# Patient Record
Sex: FEMALE
[De-identification: names, ages, dates, MRNs, and addresses within clinical notes are randomized per-mention and may not be internally consistent; named-entity substitution may affect disease eponyms.]

---

## 2017-10-06 ENCOUNTER — ASOB RESULT (OUTPATIENT)
Age: 82
End: 2017-10-06

## 2017-10-06 ENCOUNTER — APPOINTMENT (OUTPATIENT)
Dept: ANTEPARTUM | Facility: CLINIC | Age: 82
End: 2017-10-06
Payer: MEDICARE

## 2017-10-06 PROBLEM — Z00.00 ENCOUNTER FOR PREVENTIVE HEALTH EXAMINATION: Status: ACTIVE | Noted: 2017-10-06

## 2017-10-06 PROCEDURE — 76857 US EXAM PELVIC LIMITED: CPT

## 2017-10-06 PROCEDURE — 76830 TRANSVAGINAL US NON-OB: CPT

## 2023-01-01 ENCOUNTER — INPATIENT (INPATIENT)
Facility: HOSPITAL | Age: 88
LOS: 1 days | DRG: 61 | End: 2023-12-26
Attending: INTERNAL MEDICINE | Admitting: SURGERY
Payer: MEDICARE

## 2023-01-01 VITALS — WEIGHT: 146.17 LBS

## 2023-01-01 DIAGNOSIS — Z66 DO NOT RESUSCITATE: ICD-10-CM

## 2023-01-01 DIAGNOSIS — E43 UNSPECIFIED SEVERE PROTEIN-CALORIE MALNUTRITION: ICD-10-CM

## 2023-01-01 DIAGNOSIS — J90 PLEURAL EFFUSION, NOT ELSEWHERE CLASSIFIED: ICD-10-CM

## 2023-01-01 DIAGNOSIS — G81.91 HEMIPLEGIA, UNSPECIFIED AFFECTING RIGHT DOMINANT SIDE: ICD-10-CM

## 2023-01-01 DIAGNOSIS — I48.91 UNSPECIFIED ATRIAL FIBRILLATION: ICD-10-CM

## 2023-01-01 DIAGNOSIS — R29.723 NIHSS SCORE 23: ICD-10-CM

## 2023-01-01 DIAGNOSIS — I10 ESSENTIAL (PRIMARY) HYPERTENSION: ICD-10-CM

## 2023-01-01 DIAGNOSIS — I63.9 CEREBRAL INFARCTION, UNSPECIFIED: ICD-10-CM

## 2023-01-01 DIAGNOSIS — Z53.09 PROCEDURE AND TREATMENT NOT CARRIED OUT BECAUSE OF OTHER CONTRAINDICATION: ICD-10-CM

## 2023-01-01 DIAGNOSIS — R47.01 APHASIA: ICD-10-CM

## 2023-01-01 DIAGNOSIS — I16.0 HYPERTENSIVE URGENCY: ICD-10-CM

## 2023-01-01 DIAGNOSIS — I63.512 CEREBRAL INFARCTION DUE TO UNSPECIFIED OCCLUSION OR STENOSIS OF LEFT MIDDLE CEREBRAL ARTERY: ICD-10-CM

## 2023-01-01 DIAGNOSIS — N17.9 ACUTE KIDNEY FAILURE, UNSPECIFIED: ICD-10-CM

## 2023-01-01 LAB
A1C WITH ESTIMATED AVERAGE GLUCOSE RESULT: 6.1 % — HIGH (ref 4–5.6)
A1C WITH ESTIMATED AVERAGE GLUCOSE RESULT: 6.1 % — HIGH (ref 4–5.6)
ABO RH CONFIRMATION: SIGNIFICANT CHANGE UP
ABO RH CONFIRMATION: SIGNIFICANT CHANGE UP
ALBUMIN SERPL ELPH-MCNC: 3.1 G/DL — LOW (ref 3.3–5)
ALBUMIN SERPL ELPH-MCNC: 3.1 G/DL — LOW (ref 3.3–5)
ALLERGY+IMMUNOLOGY DIAG STUDY NOTE: SIGNIFICANT CHANGE UP
ALLERGY+IMMUNOLOGY DIAG STUDY NOTE: SIGNIFICANT CHANGE UP
ALP SERPL-CCNC: 173 U/L — HIGH (ref 40–120)
ALP SERPL-CCNC: 173 U/L — HIGH (ref 40–120)
ALT FLD-CCNC: 22 U/L — SIGNIFICANT CHANGE UP (ref 12–78)
ALT FLD-CCNC: 22 U/L — SIGNIFICANT CHANGE UP (ref 12–78)
ANION GAP SERPL CALC-SCNC: 7 MMOL/L — SIGNIFICANT CHANGE UP (ref 5–17)
ANION GAP SERPL CALC-SCNC: 7 MMOL/L — SIGNIFICANT CHANGE UP (ref 5–17)
ANION GAP SERPL CALC-SCNC: 9 MMOL/L — SIGNIFICANT CHANGE UP (ref 5–17)
APPEARANCE UR: CLEAR — SIGNIFICANT CHANGE UP
APPEARANCE UR: CLEAR — SIGNIFICANT CHANGE UP
APTT BLD: 26.4 SEC — SIGNIFICANT CHANGE UP (ref 24.5–35.6)
APTT BLD: 26.4 SEC — SIGNIFICANT CHANGE UP (ref 24.5–35.6)
AST SERPL-CCNC: 28 U/L — SIGNIFICANT CHANGE UP (ref 15–37)
AST SERPL-CCNC: 28 U/L — SIGNIFICANT CHANGE UP (ref 15–37)
BACTERIA # UR AUTO: NEGATIVE /HPF — SIGNIFICANT CHANGE UP
BACTERIA # UR AUTO: NEGATIVE /HPF — SIGNIFICANT CHANGE UP
BASOPHILS # BLD AUTO: 0.04 K/UL — SIGNIFICANT CHANGE UP (ref 0–0.2)
BASOPHILS # BLD AUTO: 0.04 K/UL — SIGNIFICANT CHANGE UP (ref 0–0.2)
BASOPHILS NFR BLD AUTO: 0.6 % — SIGNIFICANT CHANGE UP (ref 0–2)
BASOPHILS NFR BLD AUTO: 0.6 % — SIGNIFICANT CHANGE UP (ref 0–2)
BILIRUB SERPL-MCNC: 1.2 MG/DL — SIGNIFICANT CHANGE UP (ref 0.2–1.2)
BILIRUB SERPL-MCNC: 1.2 MG/DL — SIGNIFICANT CHANGE UP (ref 0.2–1.2)
BILIRUB UR-MCNC: NEGATIVE — SIGNIFICANT CHANGE UP
BILIRUB UR-MCNC: NEGATIVE — SIGNIFICANT CHANGE UP
BLD GP AB SCN SERPL QL: SIGNIFICANT CHANGE UP
BLD GP AB SCN SERPL QL: SIGNIFICANT CHANGE UP
BUN SERPL-MCNC: 25 MG/DL — HIGH (ref 7–23)
BUN SERPL-MCNC: 25 MG/DL — HIGH (ref 7–23)
BUN SERPL-MCNC: 29 MG/DL — HIGH (ref 7–23)
BUN SERPL-MCNC: 29 MG/DL — HIGH (ref 7–23)
BUN SERPL-MCNC: 37 MG/DL — HIGH (ref 7–23)
BUN SERPL-MCNC: 37 MG/DL — HIGH (ref 7–23)
CALCIUM SERPL-MCNC: 8.4 MG/DL — LOW (ref 8.5–10.1)
CALCIUM SERPL-MCNC: 8.4 MG/DL — LOW (ref 8.5–10.1)
CALCIUM SERPL-MCNC: 8.6 MG/DL — SIGNIFICANT CHANGE UP (ref 8.5–10.1)
CALCIUM SERPL-MCNC: 8.6 MG/DL — SIGNIFICANT CHANGE UP (ref 8.5–10.1)
CALCIUM SERPL-MCNC: 8.8 MG/DL — SIGNIFICANT CHANGE UP (ref 8.5–10.1)
CALCIUM SERPL-MCNC: 8.8 MG/DL — SIGNIFICANT CHANGE UP (ref 8.5–10.1)
CAST: 3 /LPF — SIGNIFICANT CHANGE UP (ref 0–4)
CAST: 3 /LPF — SIGNIFICANT CHANGE UP (ref 0–4)
CHLORIDE SERPL-SCNC: 107 MMOL/L — SIGNIFICANT CHANGE UP (ref 96–108)
CHLORIDE SERPL-SCNC: 109 MMOL/L — HIGH (ref 96–108)
CHLORIDE SERPL-SCNC: 109 MMOL/L — HIGH (ref 96–108)
CHOLEST SERPL-MCNC: 167 MG/DL — SIGNIFICANT CHANGE UP
CHOLEST SERPL-MCNC: 167 MG/DL — SIGNIFICANT CHANGE UP
CO2 SERPL-SCNC: 21 MMOL/L — LOW (ref 22–31)
CO2 SERPL-SCNC: 21 MMOL/L — LOW (ref 22–31)
CO2 SERPL-SCNC: 22 MMOL/L — SIGNIFICANT CHANGE UP (ref 22–31)
CO2 SERPL-SCNC: 22 MMOL/L — SIGNIFICANT CHANGE UP (ref 22–31)
CO2 SERPL-SCNC: 25 MMOL/L — SIGNIFICANT CHANGE UP (ref 22–31)
CO2 SERPL-SCNC: 25 MMOL/L — SIGNIFICANT CHANGE UP (ref 22–31)
COLOR SPEC: YELLOW — SIGNIFICANT CHANGE UP
COLOR SPEC: YELLOW — SIGNIFICANT CHANGE UP
CREAT SERPL-MCNC: 1 MG/DL — SIGNIFICANT CHANGE UP (ref 0.5–1.3)
CREAT SERPL-MCNC: 1 MG/DL — SIGNIFICANT CHANGE UP (ref 0.5–1.3)
CREAT SERPL-MCNC: 1.27 MG/DL — SIGNIFICANT CHANGE UP (ref 0.5–1.3)
CREAT SERPL-MCNC: 1.27 MG/DL — SIGNIFICANT CHANGE UP (ref 0.5–1.3)
CREAT SERPL-MCNC: 1.55 MG/DL — HIGH (ref 0.5–1.3)
CREAT SERPL-MCNC: 1.55 MG/DL — HIGH (ref 0.5–1.3)
CULTURE RESULTS: SIGNIFICANT CHANGE UP
CULTURE RESULTS: SIGNIFICANT CHANGE UP
DIFF PNL FLD: ABNORMAL
DIFF PNL FLD: ABNORMAL
DIR ANTIGLOB POLYSPECIFIC INTERPRETATION: SIGNIFICANT CHANGE UP
DIR ANTIGLOB POLYSPECIFIC INTERPRETATION: SIGNIFICANT CHANGE UP
EGFR: 32 ML/MIN/1.73M2 — LOW
EGFR: 32 ML/MIN/1.73M2 — LOW
EGFR: 40 ML/MIN/1.73M2 — LOW
EGFR: 40 ML/MIN/1.73M2 — LOW
EGFR: 54 ML/MIN/1.73M2 — LOW
EGFR: 54 ML/MIN/1.73M2 — LOW
EOSINOPHIL # BLD AUTO: 0.18 K/UL — SIGNIFICANT CHANGE UP (ref 0–0.5)
EOSINOPHIL # BLD AUTO: 0.18 K/UL — SIGNIFICANT CHANGE UP (ref 0–0.5)
EOSINOPHIL NFR BLD AUTO: 2.7 % — SIGNIFICANT CHANGE UP (ref 0–6)
EOSINOPHIL NFR BLD AUTO: 2.7 % — SIGNIFICANT CHANGE UP (ref 0–6)
ESTIMATED AVERAGE GLUCOSE: 128 MG/DL — HIGH (ref 68–114)
ESTIMATED AVERAGE GLUCOSE: 128 MG/DL — HIGH (ref 68–114)
GLUCOSE SERPL-MCNC: 118 MG/DL — HIGH (ref 70–99)
GLUCOSE SERPL-MCNC: 118 MG/DL — HIGH (ref 70–99)
GLUCOSE SERPL-MCNC: 132 MG/DL — HIGH (ref 70–99)
GLUCOSE SERPL-MCNC: 132 MG/DL — HIGH (ref 70–99)
GLUCOSE SERPL-MCNC: 136 MG/DL — HIGH (ref 70–99)
GLUCOSE SERPL-MCNC: 136 MG/DL — HIGH (ref 70–99)
GLUCOSE UR QL: NEGATIVE MG/DL — SIGNIFICANT CHANGE UP
GLUCOSE UR QL: NEGATIVE MG/DL — SIGNIFICANT CHANGE UP
HCT VFR BLD CALC: 35.5 % — SIGNIFICANT CHANGE UP (ref 34.5–45)
HCT VFR BLD CALC: 35.5 % — SIGNIFICANT CHANGE UP (ref 34.5–45)
HCT VFR BLD CALC: 35.7 % — SIGNIFICANT CHANGE UP (ref 34.5–45)
HCT VFR BLD CALC: 35.7 % — SIGNIFICANT CHANGE UP (ref 34.5–45)
HCT VFR BLD CALC: 39.2 % — SIGNIFICANT CHANGE UP (ref 34.5–45)
HCT VFR BLD CALC: 39.2 % — SIGNIFICANT CHANGE UP (ref 34.5–45)
HDLC SERPL-MCNC: 71 MG/DL — SIGNIFICANT CHANGE UP
HDLC SERPL-MCNC: 71 MG/DL — SIGNIFICANT CHANGE UP
HGB BLD-MCNC: 11.6 G/DL — SIGNIFICANT CHANGE UP (ref 11.5–15.5)
HGB BLD-MCNC: 11.6 G/DL — SIGNIFICANT CHANGE UP (ref 11.5–15.5)
HGB BLD-MCNC: 12 G/DL — SIGNIFICANT CHANGE UP (ref 11.5–15.5)
HGB BLD-MCNC: 12 G/DL — SIGNIFICANT CHANGE UP (ref 11.5–15.5)
HGB BLD-MCNC: 12.4 G/DL — SIGNIFICANT CHANGE UP (ref 11.5–15.5)
HGB BLD-MCNC: 12.4 G/DL — SIGNIFICANT CHANGE UP (ref 11.5–15.5)
IMM GRANULOCYTES NFR BLD AUTO: 0.6 % — SIGNIFICANT CHANGE UP (ref 0–0.9)
IMM GRANULOCYTES NFR BLD AUTO: 0.6 % — SIGNIFICANT CHANGE UP (ref 0–0.9)
INR BLD: 0.97 RATIO — SIGNIFICANT CHANGE UP (ref 0.85–1.18)
INR BLD: 0.97 RATIO — SIGNIFICANT CHANGE UP (ref 0.85–1.18)
KETONES UR-MCNC: ABNORMAL MG/DL
KETONES UR-MCNC: ABNORMAL MG/DL
LEUKOCYTE ESTERASE UR-ACNC: NEGATIVE — SIGNIFICANT CHANGE UP
LEUKOCYTE ESTERASE UR-ACNC: NEGATIVE — SIGNIFICANT CHANGE UP
LIPID PNL WITH DIRECT LDL SERPL: 82 MG/DL — SIGNIFICANT CHANGE UP
LIPID PNL WITH DIRECT LDL SERPL: 82 MG/DL — SIGNIFICANT CHANGE UP
LYMPHOCYTES # BLD AUTO: 0.96 K/UL — LOW (ref 1–3.3)
LYMPHOCYTES # BLD AUTO: 0.96 K/UL — LOW (ref 1–3.3)
LYMPHOCYTES # BLD AUTO: 14.1 % — SIGNIFICANT CHANGE UP (ref 13–44)
LYMPHOCYTES # BLD AUTO: 14.1 % — SIGNIFICANT CHANGE UP (ref 13–44)
MAGNESIUM SERPL-MCNC: 1.9 MG/DL — SIGNIFICANT CHANGE UP (ref 1.6–2.6)
MAGNESIUM SERPL-MCNC: 1.9 MG/DL — SIGNIFICANT CHANGE UP (ref 1.6–2.6)
MCHC RBC-ENTMCNC: 30.2 PG — SIGNIFICANT CHANGE UP (ref 27–34)
MCHC RBC-ENTMCNC: 30.2 PG — SIGNIFICANT CHANGE UP (ref 27–34)
MCHC RBC-ENTMCNC: 30.4 PG — SIGNIFICANT CHANGE UP (ref 27–34)
MCHC RBC-ENTMCNC: 30.4 PG — SIGNIFICANT CHANGE UP (ref 27–34)
MCHC RBC-ENTMCNC: 31.2 PG — SIGNIFICANT CHANGE UP (ref 27–34)
MCHC RBC-ENTMCNC: 31.2 PG — SIGNIFICANT CHANGE UP (ref 27–34)
MCHC RBC-ENTMCNC: 31.6 GM/DL — LOW (ref 32–36)
MCHC RBC-ENTMCNC: 31.6 GM/DL — LOW (ref 32–36)
MCHC RBC-ENTMCNC: 32.7 GM/DL — SIGNIFICANT CHANGE UP (ref 32–36)
MCHC RBC-ENTMCNC: 32.7 GM/DL — SIGNIFICANT CHANGE UP (ref 32–36)
MCHC RBC-ENTMCNC: 33.6 GM/DL — SIGNIFICANT CHANGE UP (ref 32–36)
MCHC RBC-ENTMCNC: 33.6 GM/DL — SIGNIFICANT CHANGE UP (ref 32–36)
MCV RBC AUTO: 92.7 FL — SIGNIFICANT CHANGE UP (ref 80–100)
MCV RBC AUTO: 92.7 FL — SIGNIFICANT CHANGE UP (ref 80–100)
MCV RBC AUTO: 93.2 FL — SIGNIFICANT CHANGE UP (ref 80–100)
MCV RBC AUTO: 93.2 FL — SIGNIFICANT CHANGE UP (ref 80–100)
MCV RBC AUTO: 95.4 FL — SIGNIFICANT CHANGE UP (ref 80–100)
MCV RBC AUTO: 95.4 FL — SIGNIFICANT CHANGE UP (ref 80–100)
MONOCYTES # BLD AUTO: 0.53 K/UL — SIGNIFICANT CHANGE UP (ref 0–0.9)
MONOCYTES # BLD AUTO: 0.53 K/UL — SIGNIFICANT CHANGE UP (ref 0–0.9)
MONOCYTES NFR BLD AUTO: 7.8 % — SIGNIFICANT CHANGE UP (ref 2–14)
MONOCYTES NFR BLD AUTO: 7.8 % — SIGNIFICANT CHANGE UP (ref 2–14)
NEUTROPHILS # BLD AUTO: 5.04 K/UL — SIGNIFICANT CHANGE UP (ref 1.8–7.4)
NEUTROPHILS # BLD AUTO: 5.04 K/UL — SIGNIFICANT CHANGE UP (ref 1.8–7.4)
NEUTROPHILS NFR BLD AUTO: 74.2 % — SIGNIFICANT CHANGE UP (ref 43–77)
NEUTROPHILS NFR BLD AUTO: 74.2 % — SIGNIFICANT CHANGE UP (ref 43–77)
NITRITE UR-MCNC: NEGATIVE — SIGNIFICANT CHANGE UP
NITRITE UR-MCNC: NEGATIVE — SIGNIFICANT CHANGE UP
NON HDL CHOLESTEROL: 96 MG/DL — SIGNIFICANT CHANGE UP
NON HDL CHOLESTEROL: 96 MG/DL — SIGNIFICANT CHANGE UP
PH UR: 6.5 — SIGNIFICANT CHANGE UP (ref 5–8)
PH UR: 6.5 — SIGNIFICANT CHANGE UP (ref 5–8)
PHOSPHATE SERPL-MCNC: 5.8 MG/DL — HIGH (ref 2.5–4.5)
PHOSPHATE SERPL-MCNC: 5.8 MG/DL — HIGH (ref 2.5–4.5)
PLATELET # BLD AUTO: 156 K/UL — SIGNIFICANT CHANGE UP (ref 150–400)
PLATELET # BLD AUTO: 156 K/UL — SIGNIFICANT CHANGE UP (ref 150–400)
PLATELET # BLD AUTO: 179 K/UL — SIGNIFICANT CHANGE UP (ref 150–400)
PLATELET # BLD AUTO: 179 K/UL — SIGNIFICANT CHANGE UP (ref 150–400)
PLATELET # BLD AUTO: 181 K/UL — SIGNIFICANT CHANGE UP (ref 150–400)
PLATELET # BLD AUTO: 181 K/UL — SIGNIFICANT CHANGE UP (ref 150–400)
POTASSIUM SERPL-MCNC: 3.4 MMOL/L — LOW (ref 3.5–5.3)
POTASSIUM SERPL-MCNC: 3.4 MMOL/L — LOW (ref 3.5–5.3)
POTASSIUM SERPL-MCNC: 4 MMOL/L — SIGNIFICANT CHANGE UP (ref 3.5–5.3)
POTASSIUM SERPL-SCNC: 3.4 MMOL/L — LOW (ref 3.5–5.3)
POTASSIUM SERPL-SCNC: 3.4 MMOL/L — LOW (ref 3.5–5.3)
POTASSIUM SERPL-SCNC: 4 MMOL/L — SIGNIFICANT CHANGE UP (ref 3.5–5.3)
PROT SERPL-MCNC: 6.8 GM/DL — SIGNIFICANT CHANGE UP (ref 6–8.3)
PROT SERPL-MCNC: 6.8 GM/DL — SIGNIFICANT CHANGE UP (ref 6–8.3)
PROT UR-MCNC: 100 MG/DL
PROT UR-MCNC: 100 MG/DL
PROTHROM AB SERPL-ACNC: 11 SEC — SIGNIFICANT CHANGE UP (ref 9.5–13)
PROTHROM AB SERPL-ACNC: 11 SEC — SIGNIFICANT CHANGE UP (ref 9.5–13)
RBC # BLD: 3.81 M/UL — SIGNIFICANT CHANGE UP (ref 3.8–5.2)
RBC # BLD: 3.81 M/UL — SIGNIFICANT CHANGE UP (ref 3.8–5.2)
RBC # BLD: 3.85 M/UL — SIGNIFICANT CHANGE UP (ref 3.8–5.2)
RBC # BLD: 3.85 M/UL — SIGNIFICANT CHANGE UP (ref 3.8–5.2)
RBC # BLD: 4.11 M/UL — SIGNIFICANT CHANGE UP (ref 3.8–5.2)
RBC # BLD: 4.11 M/UL — SIGNIFICANT CHANGE UP (ref 3.8–5.2)
RBC # FLD: 14.5 % — SIGNIFICANT CHANGE UP (ref 10.3–14.5)
RBC # FLD: 14.5 % — SIGNIFICANT CHANGE UP (ref 10.3–14.5)
RBC # FLD: 14.7 % — HIGH (ref 10.3–14.5)
RBC # FLD: 14.7 % — HIGH (ref 10.3–14.5)
RBC # FLD: 15.1 % — HIGH (ref 10.3–14.5)
RBC # FLD: 15.1 % — HIGH (ref 10.3–14.5)
RBC CASTS # UR COMP ASSIST: 5 /HPF — HIGH (ref 0–4)
RBC CASTS # UR COMP ASSIST: 5 /HPF — HIGH (ref 0–4)
SODIUM SERPL-SCNC: 138 MMOL/L — SIGNIFICANT CHANGE UP (ref 135–145)
SODIUM SERPL-SCNC: 138 MMOL/L — SIGNIFICANT CHANGE UP (ref 135–145)
SODIUM SERPL-SCNC: 139 MMOL/L — SIGNIFICANT CHANGE UP (ref 135–145)
SP GR SPEC: >1.03 — HIGH (ref 1–1.03)
SP GR SPEC: >1.03 — HIGH (ref 1–1.03)
SPECIMEN SOURCE: SIGNIFICANT CHANGE UP
SPECIMEN SOURCE: SIGNIFICANT CHANGE UP
SQUAMOUS # UR AUTO: 4 /HPF — SIGNIFICANT CHANGE UP (ref 0–5)
SQUAMOUS # UR AUTO: 4 /HPF — SIGNIFICANT CHANGE UP (ref 0–5)
TRIGL SERPL-MCNC: 70 MG/DL — SIGNIFICANT CHANGE UP
TRIGL SERPL-MCNC: 70 MG/DL — SIGNIFICANT CHANGE UP
TROPONIN I, HIGH SENSITIVITY RESULT: 37.97 NG/L — SIGNIFICANT CHANGE UP
TROPONIN I, HIGH SENSITIVITY RESULT: 37.97 NG/L — SIGNIFICANT CHANGE UP
UROBILINOGEN FLD QL: 1 MG/DL — SIGNIFICANT CHANGE UP (ref 0.2–1)
UROBILINOGEN FLD QL: 1 MG/DL — SIGNIFICANT CHANGE UP (ref 0.2–1)
WBC # BLD: 15.02 K/UL — HIGH (ref 3.8–10.5)
WBC # BLD: 15.02 K/UL — HIGH (ref 3.8–10.5)
WBC # BLD: 6.79 K/UL — SIGNIFICANT CHANGE UP (ref 3.8–10.5)
WBC # BLD: 6.79 K/UL — SIGNIFICANT CHANGE UP (ref 3.8–10.5)
WBC # BLD: 7.91 K/UL — SIGNIFICANT CHANGE UP (ref 3.8–10.5)
WBC # BLD: 7.91 K/UL — SIGNIFICANT CHANGE UP (ref 3.8–10.5)
WBC # FLD AUTO: 15.02 K/UL — HIGH (ref 3.8–10.5)
WBC # FLD AUTO: 15.02 K/UL — HIGH (ref 3.8–10.5)
WBC # FLD AUTO: 6.79 K/UL — SIGNIFICANT CHANGE UP (ref 3.8–10.5)
WBC # FLD AUTO: 6.79 K/UL — SIGNIFICANT CHANGE UP (ref 3.8–10.5)
WBC # FLD AUTO: 7.91 K/UL — SIGNIFICANT CHANGE UP (ref 3.8–10.5)
WBC # FLD AUTO: 7.91 K/UL — SIGNIFICANT CHANGE UP (ref 3.8–10.5)
WBC UR QL: 6 /HPF — HIGH (ref 0–5)
WBC UR QL: 6 /HPF — HIGH (ref 0–5)

## 2023-01-01 PROCEDURE — 86870 RBC ANTIBODY IDENTIFICATION: CPT

## 2023-01-01 PROCEDURE — 70450 CT HEAD/BRAIN W/O DYE: CPT | Mod: 26

## 2023-01-01 PROCEDURE — 97163 PT EVAL HIGH COMPLEX 45 MIN: CPT | Mod: GP

## 2023-01-01 PROCEDURE — 99232 SBSQ HOSP IP/OBS MODERATE 35: CPT

## 2023-01-01 PROCEDURE — 99291 CRITICAL CARE FIRST HOUR: CPT

## 2023-01-01 PROCEDURE — 99223 1ST HOSP IP/OBS HIGH 75: CPT

## 2023-01-01 PROCEDURE — 92526 ORAL FUNCTION THERAPY: CPT | Mod: GN

## 2023-01-01 PROCEDURE — 80061 LIPID PANEL: CPT

## 2023-01-01 PROCEDURE — 0042T: CPT | Mod: MA

## 2023-01-01 PROCEDURE — 83036 HEMOGLOBIN GLYCOSYLATED A1C: CPT

## 2023-01-01 PROCEDURE — 70450 CT HEAD/BRAIN W/O DYE: CPT

## 2023-01-01 PROCEDURE — 70496 CT ANGIOGRAPHY HEAD: CPT | Mod: 26,MA

## 2023-01-01 PROCEDURE — 93010 ELECTROCARDIOGRAM REPORT: CPT

## 2023-01-01 PROCEDURE — 85027 COMPLETE CBC AUTOMATED: CPT

## 2023-01-01 PROCEDURE — 70498 CT ANGIOGRAPHY NECK: CPT | Mod: 26,MA

## 2023-01-01 PROCEDURE — 92610 EVALUATE SWALLOWING FUNCTION: CPT | Mod: GN

## 2023-01-01 PROCEDURE — 83735 ASSAY OF MAGNESIUM: CPT

## 2023-01-01 PROCEDURE — 71045 X-RAY EXAM CHEST 1 VIEW: CPT | Mod: 26

## 2023-01-01 PROCEDURE — 70450 CT HEAD/BRAIN W/O DYE: CPT | Mod: 26,MA

## 2023-01-01 PROCEDURE — 93306 TTE W/DOPPLER COMPLETE: CPT

## 2023-01-01 PROCEDURE — 86077 PHYS BLOOD BANK SERV XMATCH: CPT

## 2023-01-01 PROCEDURE — 93306 TTE W/DOPPLER COMPLETE: CPT | Mod: 26

## 2023-01-01 PROCEDURE — 36415 COLL VENOUS BLD VENIPUNCTURE: CPT

## 2023-01-01 PROCEDURE — 84100 ASSAY OF PHOSPHORUS: CPT

## 2023-01-01 PROCEDURE — 80048 BASIC METABOLIC PNL TOTAL CA: CPT

## 2023-01-01 RX ORDER — DILTIAZEM HCL 120 MG
5 CAPSULE, EXT RELEASE 24 HR ORAL
Qty: 125 | Refills: 0 | Status: DISCONTINUED | OUTPATIENT
Start: 2023-01-01 | End: 2023-01-01

## 2023-01-01 RX ORDER — DILTIAZEM HCL 120 MG
10 CAPSULE, EXT RELEASE 24 HR ORAL ONCE
Refills: 0 | Status: COMPLETED | OUTPATIENT
Start: 2023-01-01 | End: 2023-01-01

## 2023-01-01 RX ORDER — HEPARIN SODIUM 5000 [USP'U]/ML
5000 INJECTION INTRAVENOUS; SUBCUTANEOUS EVERY 12 HOURS
Refills: 0 | Status: DISCONTINUED | OUTPATIENT
Start: 2023-01-01 | End: 2023-01-01

## 2023-01-01 RX ORDER — ACETAMINOPHEN 500 MG
650 TABLET ORAL EVERY 6 HOURS
Refills: 0 | Status: DISCONTINUED | OUTPATIENT
Start: 2023-01-01 | End: 2023-01-01

## 2023-01-01 RX ORDER — ROBINUL 0.2 MG/ML
0.2 INJECTION INTRAMUSCULAR; INTRAVENOUS EVERY 6 HOURS
Refills: 0 | Status: DISCONTINUED | OUTPATIENT
Start: 2023-01-01 | End: 2023-01-01

## 2023-01-01 RX ORDER — SODIUM CHLORIDE 9 MG/ML
10 INJECTION INTRAMUSCULAR; INTRAVENOUS; SUBCUTANEOUS ONCE
Refills: 0 | Status: COMPLETED | OUTPATIENT
Start: 2023-01-01 | End: 2023-01-01

## 2023-01-01 RX ORDER — MORPHINE SULFATE 50 MG/1
2 CAPSULE, EXTENDED RELEASE ORAL EVERY 4 HOURS
Refills: 0 | Status: DISCONTINUED | OUTPATIENT
Start: 2023-01-01 | End: 2023-01-01

## 2023-01-01 RX ORDER — SODIUM CHLORIDE 9 MG/ML
1000 INJECTION INTRAMUSCULAR; INTRAVENOUS; SUBCUTANEOUS
Refills: 0 | Status: DISCONTINUED | OUTPATIENT
Start: 2023-01-01 | End: 2023-01-01

## 2023-01-01 RX ORDER — TENECTEPLASE 50 MG
17 KIT INTRAVENOUS ONCE
Refills: 0 | Status: COMPLETED | OUTPATIENT
Start: 2023-01-01 | End: 2023-01-01

## 2023-01-01 RX ORDER — ASPIRIN/CALCIUM CARB/MAGNESIUM 324 MG
300 TABLET ORAL DAILY
Refills: 0 | Status: DISCONTINUED | OUTPATIENT
Start: 2023-01-01 | End: 2023-01-01

## 2023-01-01 RX ORDER — POTASSIUM CHLORIDE 20 MEQ
10 PACKET (EA) ORAL
Refills: 0 | Status: COMPLETED | OUTPATIENT
Start: 2023-01-01 | End: 2023-01-01

## 2023-01-01 RX ORDER — ATORVASTATIN CALCIUM 80 MG/1
80 TABLET, FILM COATED ORAL AT BEDTIME
Refills: 0 | Status: DISCONTINUED | OUTPATIENT
Start: 2023-01-01 | End: 2023-01-01

## 2023-01-01 RX ADMIN — Medication 650 MILLIGRAM(S): at 19:03

## 2023-01-01 RX ADMIN — SODIUM CHLORIDE 50 MILLILITER(S): 9 INJECTION INTRAMUSCULAR; INTRAVENOUS; SUBCUTANEOUS at 16:25

## 2023-01-01 RX ADMIN — HEPARIN SODIUM 5000 UNIT(S): 5000 INJECTION INTRAVENOUS; SUBCUTANEOUS at 21:13

## 2023-01-01 RX ADMIN — SODIUM CHLORIDE 50 MILLILITER(S): 9 INJECTION INTRAMUSCULAR; INTRAVENOUS; SUBCUTANEOUS at 12:47

## 2023-01-01 RX ADMIN — MORPHINE SULFATE 2 MILLIGRAM(S): 50 CAPSULE, EXTENDED RELEASE ORAL at 14:41

## 2023-01-01 RX ADMIN — Medication 650 MILLIGRAM(S): at 12:45

## 2023-01-01 RX ADMIN — Medication 650 MILLIGRAM(S): at 18:30

## 2023-01-01 RX ADMIN — TENECTEPLASE 2448 MILLIGRAM(S): KIT at 10:09

## 2023-01-01 RX ADMIN — Medication 300 MILLIGRAM(S): at 18:31

## 2023-01-01 RX ADMIN — Medication 650 MILLIGRAM(S): at 14:00

## 2023-01-01 RX ADMIN — MORPHINE SULFATE 2 MILLIGRAM(S): 50 CAPSULE, EXTENDED RELEASE ORAL at 14:11

## 2023-01-01 RX ADMIN — Medication 10 MILLIGRAM(S): at 10:45

## 2023-01-01 RX ADMIN — Medication 100 MILLIEQUIVALENT(S): at 12:47

## 2023-01-01 RX ADMIN — SODIUM CHLORIDE 10 MILLILITER(S): 9 INJECTION INTRAMUSCULAR; INTRAVENOUS; SUBCUTANEOUS at 10:09

## 2023-01-01 RX ADMIN — Medication 5 MG/HR: at 21:13

## 2023-01-01 RX ADMIN — Medication 650 MILLIGRAM(S): at 12:00

## 2023-01-01 RX ADMIN — Medication 5 MG/HR: at 11:29

## 2023-01-01 RX ADMIN — ROBINUL 0.2 MILLIGRAM(S): 0.2 INJECTION INTRAMUSCULAR; INTRAVENOUS at 14:11

## 2023-01-01 RX ADMIN — Medication 100 MILLIEQUIVALENT(S): at 09:50

## 2023-01-01 RX ADMIN — Medication 5 MG/HR: at 18:23

## 2023-01-01 RX ADMIN — Medication 5 MG/HR: at 10:55

## 2023-01-01 RX ADMIN — Medication 100 MILLIEQUIVALENT(S): at 10:55

## 2023-01-01 RX ADMIN — SODIUM CHLORIDE 50 MILLILITER(S): 9 INJECTION INTRAMUSCULAR; INTRAVENOUS; SUBCUTANEOUS at 21:13

## 2023-12-24 NOTE — SWALLOW BEDSIDE ASSESSMENT ADULT - COMMENTS
This Patient is a 89 year old female, Last known well 7 am, found at 8:30 sitting aphasic   CTA showed 5 ml core infarct. M2 occlusion, was not candidate for endovascular intervention  Was found to have new onset Atrial fibrillation on admission.     started on cardiazem for rate control   NIH stroke scale was 23.  Patient was aphasix, with left Gaze preference  received TNK at 10:09 am.  Service is consulted for po intake, and for speech-language as feasible.

## 2023-12-24 NOTE — ED PROVIDER NOTE - OBJECTIVE STATEMENT
Patient is a 88 yo female with hx of HTN on HCTZ per son; patient went to sleep last night at 10:30PM; patient was seen multiple times throughout the night by son- 4:30AM; 5:30AM; last seen at 7AM this morning- at baseline; found at 8:30AM; sitting on edge of bathtub; not speaking; not at baseline; required my immediate response.

## 2023-12-24 NOTE — H&P ADULT - NSHPLABSRESULTS_GEN_ALL_CORE
LABS:    CBC Full  -  ( 24 Dec 2023 10:03 )  WBC Count : 6.79 K/uL  RBC Count : 3.85 M/uL  Hemoglobin : 12.0 g/dL  Hematocrit : 35.7 %  Platelet Count - Automated : 179 K/uL  Mean Cell Volume : 92.7 fl  Mean Cell Hemoglobin : 31.2 pg  Mean Cell Hemoglobin Concentration : 33.6 gm/dL  Auto Neutrophil # : 5.04 K/uL  Auto Lymphocyte # : 0.96 K/uL  Auto Monocyte # : 0.53 K/uL  Auto Eosinophil # : 0.18 K/uL  Auto Basophil # : 0.04 K/uL  Auto Neutrophil % : 74.2 %  Auto Lymphocyte % : 14.1 %  Auto Monocyte % : 7.8 %  Auto Eosinophil % : 2.7 %  Auto Basophil % : 0.6 %    12-24    139  |  107  |  29<H>  ----------------------------<  136<H>  4.0   |  25  |  1.27    Ca    8.8      24 Dec 2023 10:03    TPro  6.8  /  Alb  3.1<L>  /  TBili  1.2  /  DBili  x   /  AST  28  /  ALT  22  /  AlkPhos  173<H>  12-24    PT/INR - ( 24 Dec 2023 10:03 )   PT: 11.0 sec;   INR: 0.97 ratio         PTT - ( 24 Dec 2023 10:03 )  PTT:26.4 sec  Urinalysis Basic - ( 24 Dec 2023 10:12 )    Color: Yellow / Appearance: Clear / SG: >1.030 / pH: x  Gluc: x / Ketone: Trace mg/dL  / Bili: Negative / Urobili: 1.0 mg/dL   Blood: x / Protein: 100 mg/dL / Nitrite: Negative   Leuk Esterase: Negative / RBC: 5 /HPF / WBC 6 /HPF   Sq Epi: x / Non Sq Epi: 4 /HPF / Bacteria: Negative /HPF      CAPILLARY BLOOD GLUCOSE      POCT Blood Glucose.: 124 mg/dL (24 Dec 2023 09:44)        LIVER FUNCTIONS - ( 24 Dec 2023 10:03 )  Alb: 3.1 g/dL / Pro: 6.8 gm/dL / ALK PHOS: 173 U/L / ALT: 22 U/L / AST: 28 U/L / GGT: x

## 2023-12-24 NOTE — ED ADULT NURSE NOTE - NSFALLHARMRISKINTERV_ED_ALL_ED
Assistance OOB with selected safe patient handling equipment if applicable/Assistance with ambulation/Communicate risk of Fall with Harm to all staff, patient, and family/Monitor gait and stability/Monitor for mental status changes and reorient to person, place, and time, as needed/Move patient closer to nursing station/within visual sight of ED staff/Provide visual cue: red socks, yellow wristband, yellow gown, etc/Reinforce activity limits and safety measures with patient and family/Toileting schedule using arm’s reach rule for commode and bathroom/Use of alarms - bed, stretcher, chair and/or video monitoring/Bed in lowest position, wheels locked, appropriate side rails in place/Call bell, personal items and telephone in reach/Instruct patient to call for assistance before getting out of bed/chair/stretcher/Non-slip footwear applied when patient is off stretcher/[place] to call system/Physically safe environment - no spills, clutter or unnecessary equipment/Purposeful Proactive Rounding/Room/bathroom lighting operational, light cord in reach Assistance OOB with selected safe patient handling equipment if applicable/Assistance with ambulation/Communicate risk of Fall with Harm to all staff, patient, and family/Monitor gait and stability/Monitor for mental status changes and reorient to person, place, and time, as needed/Move patient closer to nursing station/within visual sight of ED staff/Provide visual cue: red socks, yellow wristband, yellow gown, etc/Reinforce activity limits and safety measures with patient and family/Toileting schedule using arm’s reach rule for commode and bathroom/Use of alarms - bed, stretcher, chair and/or video monitoring/Bed in lowest position, wheels locked, appropriate side rails in place/Call bell, personal items and telephone in reach/Instruct patient to call for assistance before getting out of bed/chair/stretcher/Non-slip footwear applied when patient is off stretcher/[place] to call system/Physically safe environment - no spills, clutter or unnecessary equipment/Purposeful Proactive Rounding/Room/bathroom lighting operational, light cord in reach

## 2023-12-24 NOTE — H&P ADULT - ASSESSMENT
A:    89F  HD # 1  FULL CODE    Here for:    1. Acute CVA  2. HTN urgency  3. VITO, ? CKD  4. AF, new onset, rvr    This patient requires critical care for support of one or more vital organ systems with a high probability of imminent or life threatening deterioration in his/her condition    P:    NIHSS > 20  Seen by telestroke, no role for neuro intervention    s/p tenectaplase    MaintaIn BP < 180/105    Given thrombolytic administration, no systemic AC for now    Stroke core measures  TTE  NPO, SLP  Statin  ASA after 24h   Neuro consult  PUD ppx    GOC discussion; spoke with son at bedside, likely DNR/DNI, confirming with brother and paperwork at home    Dispo: Admit to critical care.    Date of entry of this note is equal to the date of services rendered    TOTAL CRITICAL CARE TIME:  108 minutes (EXCLUSIVE of any non bundled procedures)    Note: This is a critically ill patient. Time spent has been in salvage of life, limb and vital organ systems. This time INCLUDES time spent directly as this patient's bedside with evaluation and management, review of chart including review of laboratory and imaging studies, interpretation of vital signs and cardiac output measurements, any necessary ventilator management, and time spent discussing plan of care with patient and family, including goals of care discussion. This also includes time spent in multidisciplinary discussion with care team and various consultants to optimize treatment plan. This time may NOT include various procedures, which will be noted seperately.

## 2023-12-24 NOTE — ED PROVIDER NOTE - CLINICAL SUMMARY MEDICAL DECISION MAKING FREE TEXT BOX
88 yo female last seen normal at 7AM this morning- I spoke to son Philippe on phone and all hx obtained; found aphasic; left sided gaze preference; concern for stroke; CT head neg- TNK pushed at 10:09AM (delay in gathering HPI from son on phone); patient with M2 segment occlusion- spoke to endovascular team at [facility]- no indication for thrombectomy at this time- will be admitted to  for stroke work up; found to be in afib (new onset); endorsed to ICU team for admission. 88 yo female last seen normal at 7AM this morning- I spoke to son Philippe on phone and all hx obtained; found aphasic; left sided gaze preference; concern for stroke; CT head neg- TNK pushed at 10:09AM (delay in gathering HPI from son on phone); patient with M2 segment occlusion- spoke to endovascular team at [facility]- no indication for thrombectomy at this time- will be admitted to  for stroke work up; found to be in afib (new onset); endorsed to ICU team for admission.

## 2023-12-24 NOTE — SWALLOW BEDSIDE ASSESSMENT ADULT - SWALLOW EVAL: DIAGNOSIS
presently non-functional dysphagia in this setting of left hemisphere CVA with right sided weakness, and left gaze. ?Right neglect?

## 2023-12-24 NOTE — ED ADULT NURSE NOTE - OBJECTIVE STATEMENT
BIBEMS for possible stroke. LKW 0600. + left gaze deviation, pt unable to follow commands. CHRISTIN patient for additional subjective information d/t current neurological deficits.

## 2023-12-24 NOTE — SWALLOW BEDSIDE ASSESSMENT ADULT - NS SPL SWALLOW CLINIC TRIAL FT
pt is not a candidate at this time for po intake, 2/2 to lack of general mental status and orientation to eating routines, and then lack of oral-pharyngeal  capacity for po intake.  with regard to speech-language, pt is presently global,  Rx  keep PT npo, Keep PT at about 45 degrees to aid breathing and reflexive swallow.  Approach Pt from right if possible.   Mouth care as feasible.  Disc wit NSg. Service will follow.

## 2023-12-24 NOTE — PROGRESS NOTE ADULT - SUBJECTIVE AND OBJECTIVE BOX
Patient admitted 12/24 am, with Acute stroke symptoms       HPI:      This Patient is a 89 year old female, Last known well 7 am, found at 8:30 sitting aphasic   CTA showed 5 ml core infarct. M2 occlusion, was not candidate for endovascular intervention  Was found to have new onset Atrial fibrillation on admission.  started on cardiazem for rate control  NIH stroke scale was 23.  Patient was aphasix, with left Gaze preference  received TNK at 10:09 am    PMH:       HTN       MEDICATIONS  (STANDING):  atorvastatin 80 milliGRAM(s) Oral at bedtime  diltiazem Infusion 5 mG/Hr (5 mL/Hr) IV Continuous <Continuous>  sodium chloride 0.9%. 1000 milliLiter(s) (50 mL/Hr) IV Continuous <Continuous>    MEDICATIONS  (PRN):      Physical Exam    General lethargic weak  HEENT nc/at left gaze preference, moves left arm to command, can move right arm, right sided neglected     can move lower extremities weakly but not consistently to command  neck no bruits  cv irreg,irreg  lungs clear on room air  abdomen - soft, non tender  extremities warm   agrawal  skin no rash     Height (cm): 170 (12-24 @ 11:53)  Weight (kg): 66.3 (12-24 @ 10:02)  BMI (kg/m2): 22.9 (12-24 @ 11:53)    ICU Vital Signs Last 24 Hrs  T(C): 36.5 (24 Dec 2023 11:39), Max: 36.5 (24 Dec 2023 11:39)  T(F): 97.7 (24 Dec 2023 11:39), Max: 97.7 (24 Dec 2023 11:39)  HR: 132 (24 Dec 2023 11:39) (121 - 134)  BP: 159/97 (24 Dec 2023 11:39) (134/86 - 159/97)  BP(mean): 111 (24 Dec 2023 11:39) (103 - 114)  ABP: --  ABP(mean): --  RR: 26 (24 Dec 2023 11:39) (20 - 26)  SpO2: 99% (24 Dec 2023 11:39) (79% - 99%)    O2 Parameters below as of 24 Dec 2023 11:39  Patient On (Oxygen Delivery Method): nasal cannula  O2 Flow (L/min): 4    I&O's Summary                        12.0   6.79  )-----------( 179      ( 24 Dec 2023 10:03 )             35.7       12-24    139  |  107  |  29<H>  ----------------------------<  136<H>  4.0   |  25  |  1.27    Ca    8.8      24 Dec 2023 10:03    TPro  6.8  /  Alb  3.1<L>  /  TBili  1.2  /  DBili  x   /  AST  28  /  ALT  22  /  AlkPhos  173<H>  12-24    Urinalysis Basic - ( 24 Dec 2023 10:12 )    Color: Yellow / Appearance: Clear / SG: >1.030 / pH: x  Gluc: x / Ketone: Trace mg/dL  / Bili: Negative / Urobili: 1.0 mg/dL   Blood: x / Protein: 100 mg/dL / Nitrite: Negative   Leuk Esterase: Negative / RBC: 5 /HPF / WBC 6 /HPF   Sq Epi: x / Non Sq Epi: 4 /HPF / Bacteria: Negative /HPF    I personally reviewed the CXR: small right effusion    DVT Prophylaxis: held for 24 hours                                                                  Labs, Notes, Orders, radiologic studies reviewed and care coordinated with multidisciplinary team

## 2023-12-24 NOTE — ED ADULT NURSE NOTE - NSFALLCONCLUSION_ED_ALL_ED
GASTROENTEROLOGY CLINIC NOTE  Patient Active Problem List   Diagnosis   • Essential hypertension   • History of colon polyps   • History of melanoma in situ   • Hyperlipidemia   • Elevated hemoglobin A1c   • Low testosterone   • GERD (gastroesophageal reflux disease)   • Slow transit constipation   • Chronic insomnia   • Bilateral sensorineural hearing loss   • BPH associated with nocturia   • Severe obstructive sleep apnea       HPI:  64 year old male with history of gastroesophageal reflux disease.  On pantoprazole 40 mg daily and doing well.  Denies reflux/regurgitation/dysphagia/odynophagia/nausea/vomiting/abdominal pain.  Regular bowel movements without bright red blood per rectum or melena.  No weight loss.    BM regular with help of miralax.      Review of Systems:  An extended review of systems was obtained and negative except for that mentioned in the HPI.     Current Medications:  Current Outpatient Medications   Medication Sig Dispense Refill   • amoxicillin-clavulanate (Augmentin) 875-125 MG per tablet Take 1 tablet by mouth in the morning and 1 tablet in the evening. Do all this for 10 days. 20 tablet 0   • UNKNOWN TO PATIENT      • Nutritional Supplements (KETO PO)      • pantoprazole (PROTONIX) 40 MG tablet TAKE 1 TABLET BY MOUTH DAILY 30 tablet 1   • atorvastatin (LIPITOR) 10 MG tablet TAKE 1 TABLET BY MOUTH EVERY NIGHT 90 tablet 1   • tamsulosin (FLOMAX) 0.4 MG Cap TAKE 1 CAPSULE BY MOUTH DAILY WITH DINNER 90 capsule 1   • traZODone (DESYREL) 50 MG tablet TAKE 1 TABLET BY MOUTH EVERY NIGHT 90 tablet 1   • lisinopril (ZESTRIL) 20 MG tablet TAKE 1 TABLET BY MOUTH DAILY 90 tablet 1   • triamcinolone (ARISTOCORT) 0.1 % cream Apply 1 application topically 2 times daily as needed (groin rash). 80 g 0   • aspirin 81 MG chewable tablet Chew 81 mg by mouth daily.     • Ascorbic Acid (VITAMIN C GUMMIES PO) Take 750 mg by mouth.     • ZINC SULFATE PO Take 50 mg by mouth.     • VITAMIN D PO Take 2,000 Units by  mouth daily.     • Apoaequorin (PREVAGEN PO) Take by mouth daily.     • Cyanocobalamin (VITAMIN B 12 PO) Take 1,000 mg by mouth daily.     • Misc Natural Products (OSTEO BI-FLEX ADV TRIPLE ST PO)      • Probiotic Product (ALIGN PO) Take by mouth daily.     • NON FORMULARY Magnilife leg and back pain relief     • polyethylene glycol (MIRALAX) 17 GM/SCOOP powder Take 17 g by mouth nightly. Stir and dissolve powder in any 4 to 8 ounces of beverage, then drink.       No current facility-administered medications for this visit.       Last set of vital signs:  Blood pressure 138/86, pulse 69, height 5' 6\" (1.676 m), weight 112.5 kg (248 lb).       Physical Exam:  Gen: alert and oriented x3, no acute distress  HEENT: No scleral icterus, mask on  Card: RRR, no murmurs, rubs, or gallops  Pulm: Non labored breathing. Clear to auscultation bilaterally, no crackles, rhonchi, or wheezes  Abdomen: Soft, non-tender, non-distended, normoactive bowel sounds, no HSM  Extremities: Warm and well perfused, no LE edema noted  Skin: No rashes, bruising, petechiae    Imaging: Reviewed      LAB: Reviewed  Lab Results   Component Value Date    WBC 8.8 04/20/2023    HCT 46.4 04/20/2023    HGB 15.0 04/20/2023     04/20/2023     Lab Results   Component Value Date    GLUCOSE 101 (H) 04/20/2023    SODIUM 138 04/20/2023    POTASSIUM 5.0 04/20/2023    CHLORIDE 103 04/20/2023    BUN 14 04/20/2023    CREATININE 0.82 04/20/2023    CALCIUM 9.2 04/20/2023    ALBUMIN 3.9 04/20/2023    AST 21 04/20/2023    ALKPT 59 04/20/2023    GPT 36 04/20/2023    ANIONGAP 10 04/20/2023    GLOB 3.5 04/20/2023    AGR 1.1 04/20/2023     TSH (mcUnits/mL)   Date Value   08/29/2022 2.752       Assessment & Plan:  64-year-old male with past medical history of hypertension, hyperlipidemia, colon polyps, GERD, and slow transit constipation who was sent to GI Clinic for further evaluation      1. Gastroesophageal reflux disease, unspecified whether esophagitis  present  -11/5/2020 EGD/Colon  Post-Endoscopic diagnosis:   1. Mild to moderate diffuse gastritis in the body  2. Otherwise normal EGD  -patient with fairly longstanding history of upper GI upset and reflux   -symptoms do seem fairly well controlled on 40 mg of pantoprazole daily, will refill today  -to continue pantoprazole and ok to get prescription from pcp    2. Abdominal bloating  -patient with longstanding, but worsening issues with abdominal bloating and gas   -improving with some dietary changes      3. Irregular bowel habits  -patient with previous alternating constipation and diarrhea   -[facility] and believed to have slow transit constipation with overflow diarrhea which seems like a likely diagnosis   -will continue MiraLax and dietary/lifestyle modifications as above.  Discussed to take miralax daily (titrate dose accordingly)      4.  Colon screening  -colonoscopy 11/2020  Post-operative Diagnosis:   1. Moderate colonic diverticulosis involving the sigmoid.  2. Otherwise normal colonoscopy to the terminal ileum.  Recommendations:   1. Follow up colonoscopy in 5 years.    Follow up with pcp for pantoprazole and with us for colonoscopy screening in 11/2025 or sooner if questions or concerns.    Kyung Thurston PA-C  Supervising physician, Dr. Haja Hardwick     Fall with Harm Risk

## 2023-12-24 NOTE — SWALLOW BEDSIDE ASSESSMENT ADULT - PHARYNGEAL PHASE
no active swallow observed with closed mouth: then a "startle" open mouthed laryngeal lift was activated.

## 2023-12-24 NOTE — PATIENT PROFILE ADULT - FALL HARM RISK - HARM RISK INTERVENTIONS
Assistance OOB with selected safe patient handling equipment/Communicate Risk of Fall with Harm to all staff/Discuss with provider need for PT consult/Monitor for mental status changes/Monitor gait and stability/Move patient closer to nurses' station/Provide patient with walking aids - walker, cane, crutches/Reinforce activity limits and safety measures with patient and family/Reorient to person, place and time as needed/Tailored Fall Risk Interventions/Toileting schedule using arm’s reach rule for commode and bathroom/Use of alarms - bed, chair and/or voice tab/Visual Cue: Yellow wristband and red socks/Bed in lowest position, wheels locked, appropriate side rails in place/Call bell, personal items and telephone in reach/Instruct patient to call for assistance before getting out of bed or chair/Non-slip footwear when patient is out of bed/[place] to call system/Physically safe environment - no spills, clutter or unnecessary equipment/Purposeful Proactive Rounding/Room/bathroom lighting operational, light cord in reach Assistance OOB with selected safe patient handling equipment/Communicate Risk of Fall with Harm to all staff/Discuss with provider need for PT consult/Monitor for mental status changes/Monitor gait and stability/Move patient closer to nurses' station/Provide patient with walking aids - walker, cane, crutches/Reinforce activity limits and safety measures with patient and family/Reorient to person, place and time as needed/Tailored Fall Risk Interventions/Toileting schedule using arm’s reach rule for commode and bathroom/Use of alarms - bed, chair and/or voice tab/Visual Cue: Yellow wristband and red socks/Bed in lowest position, wheels locked, appropriate side rails in place/Call bell, personal items and telephone in reach/Instruct patient to call for assistance before getting out of bed or chair/Non-slip footwear when patient is out of bed/[place] to call system/Physically safe environment - no spills, clutter or unnecessary equipment/Purposeful Proactive Rounding/Room/bathroom lighting operational, light cord in reach

## 2023-12-24 NOTE — SWALLOW BEDSIDE ASSESSMENT ADULT - ORAL PREPARATORY PHASE
no oral or general alerting response to the spoon at the lip or slid into the mouth; with facilitation techniques pt opened mouth but did not close on the spoon. liquid placed in the anterior mouth

## 2023-12-24 NOTE — PATIENT PROFILE ADULT - HAS THE PATIENT USED TOBACCO IN THE PAST 30 DAYS?
The RHC wire was inserted in to the superior vena cava. Unable to assess due to patient's cognitive impairment

## 2023-12-24 NOTE — ED ADULT TRIAGE NOTE - CHIEF COMPLAINT QUOTE
BIBEMS for possible stroke. LKW 0600. + left gaze deviation, pt unable to follow commands. CHRISTIN patient for additional subjective information d/t current neurological deficits. code stroke activated @ 0940. . pt taken directly to CT scan.

## 2023-12-24 NOTE — H&P ADULT - HISTORY OF PRESENT ILLNESS
ICU Admission    S:    Pt seen and examined  HD # 1  88 yo female with hx of HTN on HCTZ per son; patient went to sleep last night at 10:30PM; patient was seen multiple times throughout the night by son- 4:30AM; 5:30AM; last seen at 7AM this morning- at baseline; found at 8:30AM; sitting on edge of bathtub; not speaking; not at baseline; required my immediate response.    NIHSS > 20 in ER, given tenectaplase    12/24: Remains aphasic with Lt sided gaze preference. DBP > 110, drip ordered.

## 2023-12-24 NOTE — H&P ADULT - NSHPSOCIALHISTORY_GEN_ALL_CORE
Lives at home with 92 yo  and son  Ambulates with cane Lives at home with 94 yo  and son  Ambulates with cane

## 2023-12-24 NOTE — SWALLOW BEDSIDE ASSESSMENT ADULT - SLP GENERAL OBSERVATIONS
pt semi reclining in bed; eyes with restricted eye gaze; preferentially looking to left: pt is snoring though awake, suggest reduced velo pharyngeal closure.  Mouth partly open at rest. No indication of comprehension with flat facial affect. No verbal output or vocal output.

## 2023-12-25 NOTE — CONSULT NOTE ADULT - ASSESSMENT
89 year old woman hx of HTN, presents with new L CVA, NIHSS 23. CT head + left frontal cortical CVA, ? embolic.  CT angios no LVO. s/p TNK. Adm to ICU.  Suggest:  neuro checks q 4  NPO till mental status improves  swallow evaluation  keep head elevated  rectal asa  Statin when can tolerate oral.  Would add plavix 75 mg po qd, x 21 days then d/c.  2 D ECHO. may need ABBEY if family agrees  DVT Prophylaxis  PT/OT eval

## 2023-12-25 NOTE — PHYSICAL THERAPY INITIAL EVALUATION ADULT - GENERAL OBSERVATIONS, REHAB EVAL
Pt. receive semi-supine in bed in ICU + CM + O2 5l via mouth + agrawal + IV. Bed mob with Mod A x2 sat by EOB for a few min unsupported. O2 sat dropped to 84%. Pt. back to bed and adjusted for comfort. RN in room.

## 2023-12-25 NOTE — GOALS OF CARE CONVERSATION - ADVANCED CARE PLANNING - CONVERSATION DETAILS
Patient Ariadne made her a DNR/DNI in accordance with her prior known wishes.    He will decided about a Feeding tube

## 2023-12-25 NOTE — DIETITIAN INITIAL EVALUATION ADULT - NSFNSGIIOFT_GEN_A_CORE
I&O's Detail    24 Dec 2023 07:01  -  25 Dec 2023 07:00  --------------------------------------------------------  IN:    Diltiazem: 188 mL    sodium chloride 0.9%: 629 mL  Total IN: 817 mL    OUT:    Indwelling Catheter - Urethral (mL): 450 mL  Total OUT: 450 mL    Total NET: 367 mL

## 2023-12-25 NOTE — DIETITIAN NUTRITION RISK NOTIFICATION - ADDITIONAL COMMENTS/DIETITIAN RECOMMENDATIONS
1) Keep NPO for now as per SLP; ADAT to regular pending consistency as per SLP recommendations  2) Maintain aspiration precautions, back of bed >35 degrees if/once diet advances  3) Monitor bowel movements, if no BM for >3 days, consider implementing bowel regimen.  4) MVI w/ minerals daily to ensure 100% RDA met  5) Consider adding thiamine 100 mg daily 2/2 poor PO intake/ malnutrition  6) Monitor lytes/ min and replete prn.  7) Confirm goals of care regarding nutrition support. Nutrition support is not recommended due to overall declining medical status which evidenced based studies indicate EN is not effective in prolonging survival and improving quality of life. It can also increase risk of aspiration pneumonia as well as other related issues (infection, GI complications, and worsening/ non-healing PI's). However, will provide nutrition/ hydration within GOC.  RD will continue to monitor PO intake, labs, hydration, and wt prn.

## 2023-12-25 NOTE — PROGRESS NOTE ADULT - CRITICAL CARE SERVICES
45
30
Albendazole Counseling:  I discussed with the patient the risks of albendazole including but not limited to cytopenia, kidney damage, nausea/vomiting and severe allergy.  The patient understands that this medication is being used in an off-label manner.

## 2023-12-25 NOTE — PROGRESS NOTE ADULT - SUBJECTIVE AND OBJECTIVE BOX
CC:    HPI:  89 yo female with hx of HTN on HCTZ per son; patient went to sleep last night at 10:30PM; patient was seen multiple times throughout the night by son- 4:30AM; 5:30AM; last seen at 7AM this morning- at baseline; found at 8:30AM; sitting on edge of bathtub; not speaking; not at baseline;   NIHSS > 20 in ER, given tenectaplase    12/24: Remains aphasic with Lt sided gaze preference. DBP > 110, drip ordered.    12/25: Patient having difficulty controlling her secretions, Neglecting the Right           PAST MEDICAL & SURGICAL HISTORY:  HTN (hypertension)          FAMILY HISTORY:      Social Hx:    Allergies    No Known Allergies    Intolerances        Height (cm): 170 (12-24 @ 11:53)  Weight (kg): 66.1 (12-24 @ 12:30)  BMI (kg/m2): 22.9 (12-24 @ 12:30)    ICU Vital Signs Last 24 Hrs  T(C): 37.3 (25 Dec 2023 09:00), Max: 37.4 (24 Dec 2023 18:00)  T(F): 99.1 (25 Dec 2023 09:00), Max: 99.3 (24 Dec 2023 18:00)  HR: 95 (25 Dec 2023 11:00) (74 - 132)  BP: 124/58 (25 Dec 2023 10:00) (100/54 - 163/80)  BP(mean): 77 (25 Dec 2023 10:00) (65 - 114)  ABP: --  ABP(mean): --  RR: 23 (25 Dec 2023 11:00) (20 - 35)  SpO2: 89% (25 Dec 2023 11:00) (89% - 100%)    O2 Parameters below as of 25 Dec 2023 09:00  Patient On (Oxygen Delivery Method): nasal cannula  O2 Flow (L/min): 4              I&O's Summary    24 Dec 2023 07:01  -  25 Dec 2023 07:00  --------------------------------------------------------  IN: 817 mL / OUT: 450 mL / NET: 367 mL                              11.6   7.91  )-----------( 156      ( 25 Dec 2023 05:27 )             35.5       12-25    138  |  107  |  25<H>  ----------------------------<  118<H>  3.4<L>   |  22  |  1.00    Ca    8.6      25 Dec 2023 05:27    TPro  6.8  /  Alb  3.1<L>  /  TBili  1.2  /  DBili  x   /  AST  28  /  ALT  22  /  AlkPhos  173<H>  12-24                Urinalysis Basic - ( 25 Dec 2023 05:27 )    Color: x / Appearance: x / SG: x / pH: x  Gluc: 118 mg/dL / Ketone: x  / Bili: x / Urobili: x   Blood: x / Protein: x / Nitrite: x   Leuk Esterase: x / RBC: x / WBC x   Sq Epi: x / Non Sq Epi: x / Bacteria: x        MEDICATIONS  (STANDING):  atorvastatin 80 milliGRAM(s) Oral at bedtime  diltiazem Infusion 5 mG/Hr (5 mL/Hr) IV Continuous <Continuous>  potassium chloride  10 mEq/100 mL IVPB 10 milliEquivalent(s) IV Intermittent every 1 hour  sodium chloride 0.9%. 1000 milliLiter(s) (50 mL/Hr) IV Continuous <Continuous>    MEDICATIONS  (PRN):      DVT Prophylaxis:    Advanced Directives:  Discussed with:    Visit Information: 30 min    ** Time is exclusive of billed procedures and/or teaching and/or routine family updates.

## 2023-12-25 NOTE — PROGRESS NOTE ADULT - ASSESSMENT
A/P: 89 female presenting with a CVA and received TNK    Plan:  ICU  NPO  Statin  HCT no Bleed.  Will give [place] ASA  Swallow, PT, PT  Lipid Pannel    She will be a DNR/DNI.  HCP to speak to has brother about feeding tubes  SQH DVT Prophylaxis    D/W the patients Son A/P: 89 female presenting with a CVA and received TNK    Plan:  ICU  NPO  Statin  HCT no Bleed.  Will give [place] ASA  Swallow, PT, PT  Lipid Pannel    She will be a DNR/DNI.  HCP to speak to has brother about feeding tubes  SQH DVT Prophylaxis    D/W the patients Son

## 2023-12-25 NOTE — PROGRESS NOTE ADULT - SUBJECTIVE AND OBJECTIVE BOX
Patient is a 89y old  Female who presents with a chief complaint of CVA (24 Dec 2023 12:09)      BRIEF HOSPITAL COURSE-  89 y/o Female with PMH of HTN presents to  ED with altered mental status. Collateral obtained from Son. States patient went to sleep last night at 10:30pm in normal state of health. Was seen multiple times throughout the night, last seen at 7AM this morning. Found at 8:30AM altered, unable to communicate with L fixed gaze. CODE Stroke called in ED, NIHSS > 20. CT Head negative, s/p TNK.    Events last 24 hours:   s/p TNK in ED. Found to have M2 segment occlusion. Remains aphasic.      Review of Systems:    [X] Unable to obtain secondary to current mental status/ medical condition.       PAST MEDICAL & SURGICAL HISTORY-  HTN (hypertension)        Medications:    diltiazem Infusion 5 mG/Hr IV Continuous <Continuous>    atorvastatin 80 milliGRAM(s) Oral at bedtime    sodium chloride 0.9%. 1000 milliLiter(s) IV Continuous <Continuous>          ICU Vital Signs Last 24 Hrs  T(C): 37 (25 Dec 2023 04:00), Max: 37.4 (24 Dec 2023 18:00)  T(F): 98.6 (25 Dec 2023 04:00), Max: 99.3 (24 Dec 2023 18:00)  HR: 84 (25 Dec 2023 04:00) (74 - 134)  BP: 109/50 (25 Dec 2023 04:00) (100/54 - 163/80)  BP(mean): 67 (25 Dec 2023 04:00) (65 - 114)  ABP: --  ABP(mean): --  RR: 23 (25 Dec 2023 04:00) (20 - 30)  SpO2: 97% (25 Dec 2023 04:00) (79% - 100%)    O2 Parameters below as of 25 Dec 2023 00:00  Patient On (Oxygen Delivery Method): nasal cannula  O2 Flow (L/min): 2      I&O's Detail        LABS:                        12.0   6.79  )-----------( 179      ( 24 Dec 2023 10:03 )             35.7       139  |  107  |  29<H>  ----------------------------<  136<H>  4.0   |  25  |  1.27    Ca    8.8      24 Dec 2023 10:03    TPro  6.8  /  Alb  3.1<L>  /  TBili  1.2  /  DBili  x   /  AST  28  /  ALT  22  /  AlkPhos  173<H>  12-24      CAPILLARY BLOOD GLUCOSE  POCT Blood Glucose.: 124 mg/dL (24 Dec 2023 09:44)      PT/INR - ( 24 Dec 2023 10:03 )   PT: 11.0 sec;   INR: 0.97 ratio    PTT - ( 24 Dec 2023 10:03 )  PTT:26.4 sec      Urinalysis Basic - ( 24 Dec 2023 10:12 )  Color: Yellow / Appearance: Clear / SG: >1.030 / pH: x  Gluc: x / Ketone: Trace mg/dL  / Bili: Negative / Urobili: 1.0 mg/dL   Blood: x / Protein: 100 mg/dL / Nitrite: Negative   Leuk Esterase: Negative / RBC: 5 /HPF / WBC 6 /HPF   Sq Epi: x / Non Sq Epi: 4 /HPF / Bacteria: Negative /HPF      CULTURES:      Physical Examination:  General: Elderly female, lethargic. In no acute distress.    HEENT: Pupils equal, reactive to light.  Symmetric.  PULM: Clear to auscultation bilaterally, no adventitious sounds No significant sputum production  NECK: Supple, no lymphadenopathy, trachea midline.  CVS: Irregular rate, irregularly irregular rhythm. No murmurs, rubs, or gallops. S1, S2 intact.   ABD: Soft, nondistended, nontender, normoactive bowel sounds. No masses palpable.   EXT: No edema, nontender.  SKIN: Warm and well perfused, no rashes noted.  NEURO: Alert, but minimally interactive. R sided neglect. Aphasic.   DEVICES:       RADIOLOGY-    < from: CT Brain Stroke Protocol (12.24.23 @ 09:51) >  ACC: [id number] EXAM:  CT BRAIN STROKE PROTOCOL   ORDERED BY: MAHSA FELIX     PROCEDURE DATE:  12/24/2023        INTERPRETATION:  Examinations were performed on this patient:  1. CT Angiography of the carotid arteries with IV contrast  2. CT Angiography of the intracranial circulation with IV contrast  3. CT Head without contrast  4. CT perfusion with contrast      CLINICAL INFORMATION:  Carotid stenosis. Intracranial stenosis/aneurysm.   TIA/stroke.    TECHNIQUE:   Preceding intravenous contrast contiguous axial 4 mm   sections were obtained through the head. CT angiography images at .5 mm   thickness were acquired from the aortic arch to the vertex of the skull.     Images were acquired during rapid bolus intravenous administration of 90   mL of Omnipaque 350 contrast/ 10 mL discarded.  This data set was   reconstructed axial 1 mm sections. Post processing maximum intensity   projection angiographic reconstruction of images was performed.  This   data set was reconstructed and reviewed in 2 mm sagittal and coronal   reformatted images to evaluate carotid morphology and intracranial   vessels.   The magnitude of stenosis was determined using NASCET   criteria.   This scan was performed using automatic exposure control   (radiation dose reduction software) to obtain a diagnostic image quality   scan with patient dose as low as reasonably achievable.  CT perfusion:   During IV contrast administration, serial thin section CT images of the   brain were obtained for CT perfusion. The raw data was sent to rapid   ischemia view for post processing.    FINDINGS:   No previous examinations are available for review.    The RIGHT carotid circulation demonstrates a very proximal bifurcation   located 1 cm from the origin of the RIGHT common carotid artery. The   bifurcation is patent with patent RIGHT internal and external carotid   arteries, however the RIGHT internal carotid artery is diminutive in size   compared to the RIGHT. Minimal calcified plaque at the LEFT common   carotid bifurcation without significant stenosis.   The vertebral   arteries are patent.    The intracranial circulation is significant for occlusion of a LEFT M2   segment within the sylvian fissure. There is no aneurysm, vascular   malformation .    The brain demonstrates advanced periventricular and deep white matter   ischemia..  No acute cerebral cortical infarct is seen.  No intracranial   hemorrhage is found.  The ventricles, sulci and basal cisterns appear   unremarkable.    The orbits areunremarkable.  The paranasal sinuses are clear.  The nasal   cavity remains intact.  The nasopharynx is symmetric.  The central skull   base, petrous temporal bones and calvarium remain intact.    Perfusion parameters are reported as follows:    CBF < 30%: 17 mL  TMax > 6s: 22 mL  Mismatch volume: 5 mL  Mismatch ratio: 1.3    Perfusion imaging predicted core infarct of 17 ml within the RIGHT MCA   Territory. Based on the perfusion mismatch, there is a predicted volume   of 5 mL of critically hypoperfused tissue at risk.    Small RIGHT pleural effusion.      IMPRESSION:        1.   Right carotid system:  Very proximal bifurcation located 1 cm   from the origin of the RIGHT common carotid artery. The bifurcation is   patent with patent RIGHT internal and external carotid arteries, however   the RIGHT internal carotid artery is diminutive in size compared to the   RIGHT.        2.   Left carotid system:  No hemodynamically significant stenosis.        3.   Intracranial circulation:  occlusion of a LEFT M2 segment within   the sylvian fissure.        4.   Brain:  Advanced periventricular and deep white matter ischemia.        5. Perfusion: Perfusion imaging predicted core infarct of 17 ml   within the Left MCA Territory. Based on the perfusion mismatch, there is   a predicted volume of 5 mL of critically hypoperfused tissue at risk.    Critical value:  I discussed the finding of this report with Dr. White at   9:57 AM and Dr. Felix at 10:21 AM on 12/24/2023.  Critical value policy   of the hospital was followed.  Read back and confirmation of receipt of   this communication was performed.  This verbal communication supplements   the text report of this document.    --- End of Report ---    SHERRY HURLEY MD; Attending Radiologist  This document has been electronically signed. Dec 24 2023 10:21AM    < end of copied text >      CRITICAL CARE TIME SPENT: 45 minutes assessing presenting problems of acute illness, which pose high probability of life threatening deterioration or end organ damage/dysfunction, as well as medical decision making including initiating plan of care, reviewing data, reviewing radiologic exams, discussing with multidisciplinary team,  discussing goals of care with patient/family, and writing this note.  Non-inclusive of procedures performed,      DATE OF ENTRY OF THIS NOTE IS EQUAL TO THE DATE OF SERVICES RENDERED Patient is a 89y old  Female who presents with a chief complaint of CVA (24 Dec 2023 12:09)      BRIEF HOSPITAL COURSE-  89 y/o Female with PMH of HTN presents to  ED with altered mental status. Collateral obtained from Son. States patient went to sleep last night at 10:30pm in normal state of health. Was seen multiple times throughout the night, last seen at 7AM this morning. Found at 8:30AM altered, unable to communicate with L fixed gaze. CODE Stroke called in ED, NIHSS > 20. CT Head negative, s/p TNK.    Events last 24 hours:   s/p TNK in ED. Found to have M2 segment occlusion. Remains aphasic.      Review of Systems:    [X] Unable to obtain secondary to current mental status/ medical condition.       PAST MEDICAL & SURGICAL HISTORY-  HTN (hypertension)        Medications:    diltiazem Infusion 5 mG/Hr IV Continuous <Continuous>    atorvastatin 80 milliGRAM(s) Oral at bedtime    sodium chloride 0.9%. 1000 milliLiter(s) IV Continuous <Continuous>          ICU Vital Signs Last 24 Hrs  T(C): 37 (25 Dec 2023 04:00), Max: 37.4 (24 Dec 2023 18:00)  T(F): 98.6 (25 Dec 2023 04:00), Max: 99.3 (24 Dec 2023 18:00)  HR: 84 (25 Dec 2023 04:00) (74 - 134)  BP: 109/50 (25 Dec 2023 04:00) (100/54 - 163/80)  BP(mean): 67 (25 Dec 2023 04:00) (65 - 114)  ABP: --  ABP(mean): --  RR: 23 (25 Dec 2023 04:00) (20 - 30)  SpO2: 97% (25 Dec 2023 04:00) (79% - 100%)    O2 Parameters below as of 25 Dec 2023 00:00  Patient On (Oxygen Delivery Method): nasal cannula  O2 Flow (L/min): 2      I&O's Detail        LABS:                        12.0   6.79  )-----------( 179      ( 24 Dec 2023 10:03 )             35.7       139  |  107  |  29<H>  ----------------------------<  136<H>  4.0   |  25  |  1.27    Ca    8.8      24 Dec 2023 10:03    TPro  6.8  /  Alb  3.1<L>  /  TBili  1.2  /  DBili  x   /  AST  28  /  ALT  22  /  AlkPhos  173<H>  12-24      CAPILLARY BLOOD GLUCOSE  POCT Blood Glucose.: 124 mg/dL (24 Dec 2023 09:44)      PT/INR - ( 24 Dec 2023 10:03 )   PT: 11.0 sec;   INR: 0.97 ratio    PTT - ( 24 Dec 2023 10:03 )  PTT:26.4 sec      Urinalysis Basic - ( 24 Dec 2023 10:12 )  Color: Yellow / Appearance: Clear / SG: >1.030 / pH: x  Gluc: x / Ketone: Trace mg/dL  / Bili: Negative / Urobili: 1.0 mg/dL   Blood: x / Protein: 100 mg/dL / Nitrite: Negative   Leuk Esterase: Negative / RBC: 5 /HPF / WBC 6 /HPF   Sq Epi: x / Non Sq Epi: 4 /HPF / Bacteria: Negative /HPF      CULTURES:      Physical Examination:  General: Elderly female, lethargic. In no acute distress.    HEENT: Pupils equal, reactive to light.  Symmetric.  PULM: Clear to auscultation bilaterally, no adventitious sounds No significant sputum production  NECK: Supple, no lymphadenopathy, trachea midline.  CVS: Irregular rate, irregularly irregular rhythm. No murmurs, rubs, or gallops. S1, S2 intact.   ABD: Soft, nondistended, nontender, normoactive bowel sounds. No masses palpable.   EXT: No edema, nontender.  SKIN: Warm and well perfused, no rashes noted.  NEURO: Alert, but minimally interactive. R sided neglect. Aphasic.   DEVICES:       RADIOLOGY-    < from: CT Brain Stroke Protocol (12.24.23 @ 09:51) >  ACC: [id number] EXAM:  CT BRAIN STROKE PROTOCOL   ORDERED BY: MAHSA FELIX     PROCEDURE DATE:  12/24/2023        INTERPRETATION:  Examinations were performed on this patient:  1. CT Angiography of the carotid arteries with IV contrast  2. CT Angiography of the intracranial circulation with IV contrast  3. CT Head without contrast  4. CT perfusion with contrast      CLINICAL INFORMATION:  Carotid stenosis. Intracranial stenosis/aneurysm.   TIA/stroke.    TECHNIQUE:   Preceding intravenous contrast contiguous axial 4 mm   sections were obtained through the head. CT angiography images at .5 mm   thickness were acquired from the aortic arch to the vertex of the skull.     Images were acquired during rapid bolus intravenous administration of 90   mL of Omnipaque 350 contrast/ 10 mL discarded.  This data set was   reconstructed axial 1 mm sections. Post processing maximum intensity   projection angiographic reconstruction of images was performed.  This   data set was reconstructed and reviewed in 2 mm sagittal and coronal   reformatted images to evaluate carotid morphology and intracranial   vessels.   The magnitude of stenosis was determined using NASCET   criteria.   This scan was performed using automatic exposure control   (radiation dose reduction software) to obtain a diagnostic image quality   scan with patient dose as low as reasonably achievable.  CT perfusion:   During IV contrast administration, serial thin section CT images of the   brain were obtained for CT perfusion. The raw data was sent to rapid   ischemia view for post processing.    FINDINGS:   No previous examinations are available for review.    The RIGHT carotid circulation demonstrates a very proximal bifurcation   located 1 cm from the origin of the RIGHT common carotid artery. The   bifurcation is patent with patent RIGHT internal and external carotid   arteries, however the RIGHT internal carotid artery is diminutive in size   compared to the RIGHT. Minimal calcified plaque at the LEFT common   carotid bifurcation without significant stenosis.   The vertebral   arteries are patent.    The intracranial circulation is significant for occlusion of a LEFT M2   segment within the sylvian fissure. There is no aneurysm, vascular   malformation .    The brain demonstrates advanced periventricular and deep white matter   ischemia..  No acute cerebral cortical infarct is seen.  No intracranial   hemorrhage is found.  The ventricles, sulci and basal cisterns appear   unremarkable.    The orbits areunremarkable.  The paranasal sinuses are clear.  The nasal   cavity remains intact.  The nasopharynx is symmetric.  The central skull   base, petrous temporal bones and calvarium remain intact.    Perfusion parameters are reported as follows:    CBF < 30%: 17 mL  TMax > 6s: 22 mL  Mismatch volume: 5 mL  Mismatch ratio: 1.3    Perfusion imaging predicted core infarct of 17 ml within the RIGHT MCA   Territory. Based on the perfusion mismatch, there is a predicted volume   of 5 mL of critically hypoperfused tissue at risk.    Small RIGHT pleural effusion.      IMPRESSION:        1.   Right carotid system:  Very proximal bifurcation located 1 cm   from the origin of the RIGHT common carotid artery. The bifurcation is   patent with patent RIGHT internal and external carotid arteries, however   the RIGHT internal carotid artery is diminutive in size compared to the   RIGHT.        2.   Left carotid system:  No hemodynamically significant stenosis.        3.   Intracranial circulation:  occlusion of a LEFT M2 segment within   the sylvian fissure.        4.   Brain:  Advanced periventricular and deep white matter ischemia.        5. Perfusion: Perfusion imaging predicted core infarct of 17 ml   within the Left MCA Territory. Based on the perfusion mismatch, there is   a predicted volume of 5 mL of critically hypoperfused tissue at risk.    Critical value:  I discussed the finding of this report with Dr. White at   9:57 AM and Dr. Felix at 10:21 AM on 12/24/2023.  Critical value policy   of the hospital was followed.  Read back and confirmation of receipt of   this communication was performed.  This verbal communication supplements   the text report of this document.    --- End of Report ---    SHERRY HURLEY MD; Attending Radiologist  This document has been electronically signed. Dec 24 2023 10:21AM    < end of copied text >      CRITICAL CARE TIME SPENT: 45 minutes assessing presenting problems of acute illness, which pose high probability of life threatening deterioration or end organ damage/dysfunction, as well as medical decision making including initiating plan of care, reviewing data, reviewing radiologic exams, discussing with multidisciplinary team,  discussing goals of care with patient/family, and writing this note.  Non-inclusive of procedures performed,      DATE OF ENTRY OF THIS NOTE IS EQUAL TO THE DATE OF SERVICES RENDERED

## 2023-12-25 NOTE — DIETITIAN INITIAL EVALUATION ADULT - OTHER INFO
89 yo female with hx of HTN on HCTZ per son; patient went to sleep last night at 10:30PM; patient was seen multiple times throughout the night by son- 4:30AM; 5:30AM; last seen at 7AM this morning- at baseline; found at 8:30AM; sitting on edge of bathtub; not speaking; not at baseline; required my immediate response.  Admit for acute CVA     Remains FULL CODE. Seen by SLP on 12/24 - keep NPO restrictions; "unable to assess due to poor participation/comprehension." Unable to obtain diet/wt hx 2/2 w/ aphasia s/p stroke. Bed scale wt of 135# taken by RD on 12/25/23. NFPE reveals mild-mod muscle/ fat wasting - appears thin and frail. Unable to assess legs due to pressure cuffs; edema could be masking losses, skewing appearance (+2 generalized edema doc'd on 12/24). Keep NPO for now as per SLP; ADAT to regular pending consistency as per SLP recommendations. Confirm goals of care regarding nutrition support. Nutrition support is not recommended due to overall declining medical status which evidenced based studies indicate EN is not effective in prolonging survival and improving quality of life. It can also increase risk of aspiration pneumonia as well as other related issues (infection, GI complications, and worsening/ non-healing PI's). However, will provide nutrition/ hydration within GOC. See below for other recommendations.       88 yo female with hx of HTN on HCTZ per son; patient went to sleep last night at 10:30PM; patient was seen multiple times throughout the night by son- 4:30AM; 5:30AM; last seen at 7AM this morning- at baseline; found at 8:30AM; sitting on edge of bathtub; not speaking; not at baseline; required my immediate response.  Admit for acute CVA     Remains FULL CODE. Seen by SLP on 12/24 - keep NPO restrictions; "unable to assess due to poor participation/comprehension." Unable to obtain diet/wt hx 2/2 w/ aphasia s/p stroke. Bed scale wt of 135# taken by RD on 12/25/23. NFPE reveals mild-mod muscle/ fat wasting - appears thin and frail. Unable to assess legs due to pressure cuffs; edema could be masking losses, skewing appearance (+2 generalized edema doc'd on 12/24). Keep NPO for now as per SLP; ADAT to regular pending consistency as per SLP recommendations. Confirm goals of care regarding nutrition support. Nutrition support is not recommended due to overall declining medical status which evidenced based studies indicate EN is not effective in prolonging survival and improving quality of life. It can also increase risk of aspiration pneumonia as well as other related issues (infection, GI complications, and worsening/ non-healing PI's). However, will provide nutrition/ hydration within GOC. See below for other recommendations.

## 2023-12-25 NOTE — PROGRESS NOTE ADULT - ASSESSMENT
89 y/o Female with PMH of HTN presents to  ED with altered mental status with:       1. Acute CVA   2. HTN Urgency   3. VITO  4. AFib w/ RVR      -Patient completely aphasic with left gaze and right sided neglect. NIH 23 on arrival. CODE stroke called. CT Head negative for ICH. s/p TNK.   -Found to have M2 segment occlusion deemed not candidate for intervention by [facility]. Neuro checks Q1 hr. Repeat CT Head 24hrs post TNK or STAT if acute change in mental status. Will need MRI.   -Lipid panel and A1C in AM. Start High dose statin. No anti-platelet 24hrs post TNk. Start ASA in AM.   -New onset AFib w/ RVR, likely etiology of CVA. HR difficult to control, s/p multiple IVP. Initiated on Cardizem gtt, titrate to HR<110. BP goal <180/105, allow for permissive HTN. Monitor clinical and laboratory end points of perfusion, maintain MAP >65.   -Maintaining O2>93% on NC. Actively titrating FiO2 as tolerated.   -VITO, baseline Cr unknown. Gentle hydration with NS while NPO. Avoid nephrotoxic agents. Trend labs, replete lytes as needed to maintain K>4, Mg>2 and Phos>2.   -NPO. GI prophylaxis with Protonix.  88 y/o Female with PMH of HTN presents to  ED with altered mental status with:       1. Acute CVA   2. HTN Urgency   3. VITO  4. AFib w/ RVR      -Patient completely aphasic with left gaze and right sided neglect. NIH 23 on arrival. CODE stroke called. CT Head negative for ICH. s/p TNK.   -Found to have M2 segment occlusion deemed not candidate for intervention by [facility]. Neuro checks Q1 hr. Repeat CT Head 24hrs post TNK or STAT if acute change in mental status. Will need MRI.   -Lipid panel and A1C in AM. Start High dose statin. No anti-platelet 24hrs post TNk. Start ASA in AM.   -New onset AFib w/ RVR, likely etiology of CVA. HR difficult to control, s/p multiple IVP. Initiated on Cardizem gtt, titrate to HR<110. BP goal <180/105, allow for permissive HTN. Monitor clinical and laboratory end points of perfusion, maintain MAP >65.   -Maintaining O2>93% on NC. Actively titrating FiO2 as tolerated.   -VITO, baseline Cr unknown. Gentle hydration with NS while NPO. Avoid nephrotoxic agents. Trend labs, replete lytes as needed to maintain K>4, Mg>2 and Phos>2.   -NPO. GI prophylaxis with Protonix.  88 y/o Female with PMH of HTN presents to  ED with altered mental status with:       1. Acute CVA   2. HTN Urgency   3. VITO  4. AFib w/ RVR        -Patient completely aphasic with left gaze and right sided neglect. NIH 23 on arrival. CODE stroke called. CT Head negative for ICH. s/p TNK.   -Found to have M2 segment occlusion deemed not candidate for intervention by [facility]. Neuro checks Q1 hr. Repeat CT Head 24hrs post TNK or STAT if acute change in mental status. Will need MRI.   -Lipid panel and A1C in AM. Start High dose statin. No anti-platelet 24hrs post TNk. Start ASA in AM.   -New onset AFib w/ RVR, likely etiology of CVA. HR difficult to control, s/p multiple IVP. Initiated on Cardizem gtt, titrate to HR<110. BP goal <180/105, allow for permissive HTN. Monitor clinical and laboratory end points of perfusion, maintain MAP >65.   -Maintaining O2>93% on NC. Actively titrating FiO2 as tolerated.   -VITO, baseline Cr unknown. Gentle hydration with NS while NPO. Avoid nephrotoxic agents. Trend labs, replete lytes as needed to maintain K>4, Mg>2 and Phos>2.   -NPO. GI prophylaxis with Protonix.  89 y/o Female with PMH of HTN presents to  ED with altered mental status with:       1. Acute CVA   2. HTN Urgency   3. VITO  4. AFib w/ RVR        -Patient completely aphasic with left gaze and right sided neglect. NIH 23 on arrival. CODE stroke called. CT Head negative for ICH. s/p TNK.   -Found to have M2 segment occlusion deemed not candidate for intervention by [facility]. Neuro checks Q1 hr. Repeat CT Head 24hrs post TNK or STAT if acute change in mental status. Will need MRI.   -Lipid panel and A1C in AM. Start High dose statin. No anti-platelet 24hrs post TNk. Start ASA in AM.   -New onset AFib w/ RVR, likely etiology of CVA. HR difficult to control, s/p multiple IVP. Initiated on Cardizem gtt, titrate to HR<110. BP goal <180/105, allow for permissive HTN. Monitor clinical and laboratory end points of perfusion, maintain MAP >65.   -Maintaining O2>93% on NC. Actively titrating FiO2 as tolerated.   -VITO, baseline Cr unknown. Gentle hydration with NS while NPO. Avoid nephrotoxic agents. Trend labs, replete lytes as needed to maintain K>4, Mg>2 and Phos>2.   -NPO. GI prophylaxis with Protonix.

## 2023-12-25 NOTE — PHYSICAL THERAPY INITIAL EVALUATION ADULT - PERTINENT HX OF CURRENT PROBLEM, REHAB EVAL
89 year old female admitted with Acute Stroke. NIH initially 23. Patient completely aphasix with left gaze and right sided neglected 89 year old female admitted with Acute Stroke. NIH initially 23. Patient completely aphasic with left gaze and right sided neglected

## 2023-12-25 NOTE — DIETITIAN INITIAL EVALUATION ADULT - PERTINENT MEDS FT
MEDICATIONS  (STANDING):  atorvastatin 80 milliGRAM(s) Oral at bedtime  diltiazem Infusion 5 mG/Hr (5 mL/Hr) IV Continuous <Continuous>  potassium chloride  10 mEq/100 mL IVPB 10 milliEquivalent(s) IV Intermittent every 1 hour  sodium chloride 0.9%. 1000 milliLiter(s) (50 mL/Hr) IV Continuous <Continuous>    MEDICATIONS  (PRN):

## 2023-12-25 NOTE — CONSULT NOTE ADULT - SUBJECTIVE AND OBJECTIVE BOX
Neurology Consult requested by:   Patient is a 89y old  Female who presents with a chief complaint of CVA (25 Dec 2023 11:05)     HPI:  89 yo woman PMhx of HTN on HCTZ per son; found at 8:30AM yesterday; sitting on edge of bathtub; not speaking; not at baseline. Evaluated in ED NIHSS 23. s/p TNK. Admitted to ICU.  As per son at bedside, mother lives with her , he shares a lower level apartment. Has been declining over time, with recurrent falls, mild cognitive impairment.        PAST MEDICAL & SURGICAL HISTORY:  HTN (hypertension)        FAMILY HISTORY:    Social Hx:  Nonsmoker, no drug or alcohol use  Medications and Allergies ReviewedMEDICATIONS  (STANDING):  aspirin Suppository 300 milliGRAM(s) Rectal daily  atorvastatin 80 milliGRAM(s) Oral at bedtime  diltiazem Infusion 5 mG/Hr (5 mL/Hr) IV Continuous <Continuous>  heparin   Injectable 5000 Unit(s) SubCutaneous every 12 hours  potassium chloride  10 mEq/100 mL IVPB 10 milliEquivalent(s) IV Intermittent every 1 hour  sodium chloride 0.9%. 1000 milliLiter(s) (50 mL/Hr) IV Continuous <Continuous>     ROS: Pertinent positives in HPI, all other ROS were reviewed and are negative.      Examination:   Vital Signs Last 24 Hrs  T(C): 37.3 (25 Dec 2023 09:00), Max: 37.4 (24 Dec 2023 18:00)  T(F): 99.1 (25 Dec 2023 09:00), Max: 99.3 (24 Dec 2023 18:00)  HR: 95 (25 Dec 2023 11:00) (74 - 127)  BP: 119/60 (25 Dec 2023 11:00) (100/54 - 163/80)  BP(mean): 77 (25 Dec 2023 11:00) (65 - 114)  RR: 23 (25 Dec 2023 11:00) (20 - 35)  SpO2: 89% (25 Dec 2023 11:00) (89% - 100%)    Parameters below as of 25 Dec 2023 09:00  Patient On (Oxygen Delivery Method): nasal cannula  O2 Flow (L/min): 4    General: NAD   NECK: supple, no masses  ENT: Normal hearing   Vascular : no carotid bruits,   Lungs: CTAB  Chest: RRR, no murmurs  Extremities: nontender, no edema  Musculoskeletal: no adventitious movements, no joint stiffness  Skin: no rash    Neurological Examination:  NIHSS:23  MS: Arouses, moans, doesnt follow commands   CN: Blinks to threat bilat, Left gaze preference,  V1-3 sensation intact, face asymmetric, Right facial droophearing intact, tongue midline, SCM equal bilaterally  Motor: increased tone bilat, moves left side better, not to command   Sens: slight withdrawal bilat to pain   Reflexes: 0-1/4 all over, mute toes b/l  Coord: Cannot test   Gait: Cannot test    Complete Blood Count (12.25.23 @ 05:27)   WBC Count: 7.91 K/uL  RBC Count: 3.81 M/uL  Hemoglobin: 11.6 g/dL  Hematocrit: 35.5 %  Mean Cell Volume: 93.2 fl  Mean Cell Hemoglobin: 30.4 pg  Mean Cell Hemoglobin Conc: 32.7 gm/dL  Red Cell Distrib Width: 14.5 %  Platelet Count - Automated: 156 K/uL    Basic Metabolic Panel (12.25.23 @ 05:27)   Sodium: 138 mmol/L  Potassium: 3.4 mmol/L  Chloride: 107 mmol/L  Carbon Dioxide: 22 mmol/L  Anion Gap: 9 mmol/L  Blood Urea Nitrogen: 25 mg/dL  Creatinine: 1.00 mg/dL  Glucose: 118 mg/dL  Calcium: 8.6 mg/dL  eGFR: 54:     Cholesterol: 167: Interpretive Comment:   Triglycerides, Serum: 70: Interpretive Comment:   HDL Cholesterol: 71: Interpretive Comment:   Non HDL Cholesterol: 96: Optimal Non-HDL Cholesterol (Non-HDL-C)   LDL Cholesterol Calculated: 82: Optimal LDL Cholesterol (LDL-C)     < from: CT Head No Cont (12.25.23 @ 09:34) >  FINDINGS:   CT dated 12/24/2023 available for review.    The brain demonstrates moderate periventricular and deep white matter   ischemia. Small acute cortical infarction is seen in the LEFT lateral   frontal lobe at the convexity with loss of gray-white differentiation and   gyral edema. No intracranial hemorrhage is found.  No mass effect is   found in the brain.    The ventricles, sulci and basal cisterns appear unremarkable.    The orbits are unremarkable.  The paranasal sinuses are clear.  The nasal   cavity appears intact.  The nasopharynx is symmetric.  The central skull   base, petrous temporal bones and calvarium remain intact.      IMPRESSION:   Moderate periventricular and deep white matter ischemia.   Small acute cortical infarction is seen in the LEFT lateral frontal lobe   at the convexity with loss of gray-white differentiation and gyral edema.   No intracranial hemorrhage is found.    < end of copied text >    < from: CT Brain Perfusion Maps Stroke (12.24.23 @ 10:06) >  CTA head and neck:    After the intravenous power injection of 80 cc of Omnipaque 350 using a   bolus obinna timing run serial thin sections were obtained through the   neck from the thoracic inlet through the intracranial circulation   centered at the [id number] on a multislice CT scanner reformatted   with coronal and sagittal 2 D-MIP projections, including 3 D   reconstructions using a separate 3D [employer]a software workstation.A total   of  120 cc of Omnipaque were intravenously injected.  80 cc were discarded    The origins of the carotid and vertebral arteries are normal. There is an   extremely short right common carotid artery with a low bifurcation at the   level of T7. The carotid bifurcations demonstrate no stenosis. The right   vertebral artery is dominant.    The distal vertebral arteries are well identified as are the   posterior-inferior cerebellar arteries bilaterally. The region of the   vertebral basilar junction is normal. The basilar artery is normal. The   posterior cerebral and superior cerebellar arteries are normal.    Evaluation of the carotid arteries demonstrate normal appearance to the   distal cervical, petrous, cavernous and supraclinoid internal carotid   arteries. The anterior cerebral arteries anterior communicating artery   and middle cerebral arteries are normal, the left A1 segment is dominant.    There are no large vessel occlusions.    The normal intracranial venous circulation is identified. The left   transverse sinus is dominant. The superior sagittal sinus, internal   cerebral veins, vein of Kev, straight sinus, transverse sinuses,   sigmoid sinuses and internal jugular veins are normal. Cortical veins are   normal.    There is a right pleural effusion. Mild degenerative changes of the   cervical spine are noted.    IMPRESSION: Small focus of territory at risk for infarction of 5 mL of   the left frontal cortex. No large vessel occlusion using CTA of the head   and neck.    < end of copied text >     Neurology Consult requested by:   Patient is a 89y old  Female who presents with a chief complaint of CVA (25 Dec 2023 11:05)     HPI:  88 yo woman PMhx of HTN on HCTZ per son; found at 8:30AM yesterday; sitting on edge of bathtub; not speaking; not at baseline. Evaluated in ED NIHSS 23. s/p TNK. Admitted to ICU.  As per son at bedside, mother lives with her , he shares a lower level apartment. Has been declining over time, with recurrent falls, mild cognitive impairment.        PAST MEDICAL & SURGICAL HISTORY:  HTN (hypertension)        FAMILY HISTORY:    Social Hx:  Nonsmoker, no drug or alcohol use  Medications and Allergies ReviewedMEDICATIONS  (STANDING):  aspirin Suppository 300 milliGRAM(s) Rectal daily  atorvastatin 80 milliGRAM(s) Oral at bedtime  diltiazem Infusion 5 mG/Hr (5 mL/Hr) IV Continuous <Continuous>  heparin   Injectable 5000 Unit(s) SubCutaneous every 12 hours  potassium chloride  10 mEq/100 mL IVPB 10 milliEquivalent(s) IV Intermittent every 1 hour  sodium chloride 0.9%. 1000 milliLiter(s) (50 mL/Hr) IV Continuous <Continuous>     ROS: Pertinent positives in HPI, all other ROS were reviewed and are negative.      Examination:   Vital Signs Last 24 Hrs  T(C): 37.3 (25 Dec 2023 09:00), Max: 37.4 (24 Dec 2023 18:00)  T(F): 99.1 (25 Dec 2023 09:00), Max: 99.3 (24 Dec 2023 18:00)  HR: 95 (25 Dec 2023 11:00) (74 - 127)  BP: 119/60 (25 Dec 2023 11:00) (100/54 - 163/80)  BP(mean): 77 (25 Dec 2023 11:00) (65 - 114)  RR: 23 (25 Dec 2023 11:00) (20 - 35)  SpO2: 89% (25 Dec 2023 11:00) (89% - 100%)    Parameters below as of 25 Dec 2023 09:00  Patient On (Oxygen Delivery Method): nasal cannula  O2 Flow (L/min): 4    General: NAD   NECK: supple, no masses  ENT: Normal hearing   Vascular : no carotid bruits,   Lungs: CTAB  Chest: RRR, no murmurs  Extremities: nontender, no edema  Musculoskeletal: no adventitious movements, no joint stiffness  Skin: no rash    Neurological Examination:  NIHSS:23  MS: Arouses, moans, doesnt follow commands   CN: Blinks to threat bilat, Left gaze preference,  V1-3 sensation intact, face asymmetric, Right facial droophearing intact, tongue midline, SCM equal bilaterally  Motor: increased tone bilat, moves left side better, not to command   Sens: slight withdrawal bilat to pain   Reflexes: 0-1/4 all over, mute toes b/l  Coord: Cannot test   Gait: Cannot test    Complete Blood Count (12.25.23 @ 05:27)   WBC Count: 7.91 K/uL  RBC Count: 3.81 M/uL  Hemoglobin: 11.6 g/dL  Hematocrit: 35.5 %  Mean Cell Volume: 93.2 fl  Mean Cell Hemoglobin: 30.4 pg  Mean Cell Hemoglobin Conc: 32.7 gm/dL  Red Cell Distrib Width: 14.5 %  Platelet Count - Automated: 156 K/uL    Basic Metabolic Panel (12.25.23 @ 05:27)   Sodium: 138 mmol/L  Potassium: 3.4 mmol/L  Chloride: 107 mmol/L  Carbon Dioxide: 22 mmol/L  Anion Gap: 9 mmol/L  Blood Urea Nitrogen: 25 mg/dL  Creatinine: 1.00 mg/dL  Glucose: 118 mg/dL  Calcium: 8.6 mg/dL  eGFR: 54:     Cholesterol: 167: Interpretive Comment:   Triglycerides, Serum: 70: Interpretive Comment:   HDL Cholesterol: 71: Interpretive Comment:   Non HDL Cholesterol: 96: Optimal Non-HDL Cholesterol (Non-HDL-C)   LDL Cholesterol Calculated: 82: Optimal LDL Cholesterol (LDL-C)     < from: CT Head No Cont (12.25.23 @ 09:34) >  FINDINGS:   CT dated 12/24/2023 available for review.    The brain demonstrates moderate periventricular and deep white matter   ischemia. Small acute cortical infarction is seen in the LEFT lateral   frontal lobe at the convexity with loss of gray-white differentiation and   gyral edema. No intracranial hemorrhage is found.  No mass effect is   found in the brain.    The ventricles, sulci and basal cisterns appear unremarkable.    The orbits are unremarkable.  The paranasal sinuses are clear.  The nasal   cavity appears intact.  The nasopharynx is symmetric.  The central skull   base, petrous temporal bones and calvarium remain intact.      IMPRESSION:   Moderate periventricular and deep white matter ischemia.   Small acute cortical infarction is seen in the LEFT lateral frontal lobe   at the convexity with loss of gray-white differentiation and gyral edema.   No intracranial hemorrhage is found.    < end of copied text >    < from: CT Brain Perfusion Maps Stroke (12.24.23 @ 10:06) >  CTA head and neck:    After the intravenous power injection of 80 cc of Omnipaque 350 using a   bolus obinna timing run serial thin sections were obtained through the   neck from the thoracic inlet through the intracranial circulation   centered at the [id number] on a multislice CT scanner reformatted   with coronal and sagittal 2 D-MIP projections, including 3 D   reconstructions using a separate 3D [employer] software workstation.A total   of  120 cc of Omnipaque were intravenously injected.  80 cc were discarded    The origins of the carotid and vertebral arteries are normal. There is an   extremely short right common carotid artery with a low bifurcation at the   level of T7. The carotid bifurcations demonstrate no stenosis. The right   vertebral artery is dominant.    The distal vertebral arteries are well identified as are the   posterior-inferior cerebellar arteries bilaterally. The region of the   vertebral basilar junction is normal. The basilar artery is normal. The   posterior cerebral and superior cerebellar arteries are normal.    Evaluation of the carotid arteries demonstrate normal appearance to the   distal cervical, petrous, cavernous and supraclinoid internal carotid   arteries. The anterior cerebral arteries anterior communicating artery   and middle cerebral arteries are normal, the left A1 segment is dominant.    There are no large vessel occlusions.    The normal intracranial venous circulation is identified. The left   transverse sinus is dominant. The superior sagittal sinus, internal   cerebral veins, vein of Kev, straight sinus, transverse sinuses,   sigmoid sinuses and internal jugular veins are normal. Cortical veins are   normal.    There is a right pleural effusion. Mild degenerative changes of the   cervical spine are noted.    IMPRESSION: Small focus of territory at risk for infarction of 5 mL of   the left frontal cortex. No large vessel occlusion using CTA of the head   and neck.    < end of copied text >

## 2023-12-25 NOTE — DIETITIAN INITIAL EVALUATION ADULT - PERTINENT LABORATORY DATA
12-25    138  |  107  |  25<H>  ----------------------------<  118<H>  3.4<L>   |  22  |  1.00    Ca    8.6      25 Dec 2023 05:27    TPro  6.8  /  Alb  3.1<L>  /  TBili  1.2  /  DBili  x   /  AST  28  /  ALT  22  /  AlkPhos  173<H>  12-24  POCT Blood Glucose.: 124 mg/dL (12-24-23 @ 09:44)  A1C with Estimated Average Glucose Result: 6.1 % (12-24-23 @ 15:26)

## 2023-12-26 NOTE — DISCHARGE NOTE FOR THE EXPIRED PATIENT - HOSPITAL COURSE
Admitted with a l 2 2 occlusion  Received TNK.  Symptoms did not improve.  Family made the patient DNR/DNI  PAtient passed with the family at bedside

## 2023-12-26 NOTE — CONSULT NOTE ADULT - SUBJECTIVE AND OBJECTIVE BOX
89yF was admitted on 12-24    Patient is a 89y old  Female who presents with a chief complaint of CVA (26 Dec 2023 10:25)    HPI:  Pt seen and examined  HD # 1  88 yo female with hx of HTN on HCTZ per son; patient went to sleep last night at 10:30PM; patient was seen multiple times throughout the night by son- 4:30AM; 5:30AM; last seen at 7AM this morning- at baseline; found at 8:30AM; sitting on edge of bathtub; not speaking; not at baseline; required my immediate response.    NIHSS > 20 in ER, given tenectaplase    12/24: Remains aphasic with Lt sided gaze preference. DBP > 110, drip ordered.     (24 Dec 2023 11:36)      Imaging performed:  Head CT 12/24/2023  Small focus of territory at risk for infarction of 5 mL of the left frontal cortex. No large vessel occlusion using CTA of the head and neck.    MRI Brain pending    REVIEW OF SYSTEMS  Constitutional - No fever, No weight loss, No fatigue  HEENT - No eye pain, No visual disturbances, No difficulty hearing, No tinnitus, No vertigo, No neck pain  Respiratory - No cough, No wheezing, No shortness of breath  Cardiovascular - No chest pain, No palpitations  Gastrointestinal - No abdominal pain, No nausea, No vomiting, No diarrhea, No constipation  Genitourinary - No dysuria, No frequency, No hematuria, No incontinence  Neurological - No headaches, No memory loss, No loss of strength, No numbness, No tremors  Skin - No itching, No rashes, No lesions   Endocrine - No temperature intolerance  Musculoskeletal - No joint pain, No joint swelling, No muscle pain  Psychiatric - No depression, No anxiety    VITALS  T(C): 35.4 (12-26-23 @ 08:00), Max: 38.8 (12-25-23 @ 18:00)  HR: 63 (12-26-23 @ 10:00) (63 - 113)  BP: 85/46 (12-26-23 @ 10:00) (85/46 - 137/72)  RR: 26 (12-26-23 @ 10:00) (20 - 32)  SpO2: 89% (12-26-23 @ 10:00) (87% - 94%)  Wt(kg): --    PAST MEDICAL & SURGICAL HISTORY  HTN (hypertension)        SOCIAL HISTORY - as per documentation/history  Smoking - None  EtOH - None  Drugs - None    FUNCTIONAL HISTORY  Lives   Independent    CURRENT FUNCTIONAL STATUS      FAMILY HISTORY       RECENT LABS - Reviewed  CBC Full  -  ( 26 Dec 2023 05:26 )  WBC Count : 15.02 K/uL  RBC Count : 4.11 M/uL  Hemoglobin : 12.4 g/dL  Hematocrit : 39.2 %  Platelet Count - Automated : 181 K/uL  Mean Cell Volume : 95.4 fl  Mean Cell Hemoglobin : 30.2 pg  Mean Cell Hemoglobin Concentration : 31.6 gm/dL  Auto Neutrophil # : x  Auto Lymphocyte # : x  Auto Monocyte # : x  Auto Eosinophil # : x  Auto Basophil # : x  Auto Neutrophil % : x  Auto Lymphocyte % : x  Auto Monocyte % : x  Auto Eosinophil % : x  Auto Basophil % : x    12-26    139  |  109<H>  |  37<H>  ----------------------------<  132<H>  4.0   |  21<L>  |  1.55<H>    Ca    8.4<L>      26 Dec 2023 05:26  Phos  5.8     12-26  Mg     1.9     12-26      Urinalysis Basic - ( 26 Dec 2023 05:26 )    Color: x / Appearance: x / SG: x / pH: x  Gluc: 132 mg/dL / Ketone: x  / Bili: x / Urobili: x   Blood: x / Protein: x / Nitrite: x   Leuk Esterase: x / RBC: x / WBC x   Sq Epi: x / Non Sq Epi: x / Bacteria: x        ALLERGIES  No Known Allergies      MEDICATIONS   acetaminophen  Suppository .. 650 milliGRAM(s) Rectal every 6 hours PRN  aspirin Suppository 300 milliGRAM(s) Rectal daily  atorvastatin 80 milliGRAM(s) Oral at bedtime  diltiazem Infusion 5 mG/Hr IV Continuous <Continuous>  glycopyrrolate Injectable 0.2 milliGRAM(s) IV Push every 6 hours PRN  heparin   Injectable 5000 Unit(s) SubCutaneous every 12 hours  morphine  - Injectable 2 milliGRAM(s) IV Push every 4 hours PRN  sodium chloride 0.9%. 1000 milliLiter(s) IV Continuous <Continuous>      ----------------------------------------------------------------------------------------  PHYSICAL EXAM  Constitutional - NAD, Comfortable  HEENT - NCAT, EOMI  Neck - Supple, No limited ROM  Chest - Breathing comfortably, No wheezing  Cardiovascular - S1S2   Abdomen - Soft   Extremities - No C/C/E, No calf tenderness   Neurologic Exam -                    Cognitive - AAO to self, place, date, year, situation     Communication - Fluent, No dysarthria     Cranial Nerves - CN 2-12 intact     Motor - No focal deficits                    LEFT    UE - ShAB 5/5, EF 5/5, EE 5/5, WE 5/5,  5/5                    RIGHT UE - ShAB 5/5, EF 5/5, EE 5/5, WE 5/5,  5/5                    LEFT    LE - HF 5/5, KE 5/5, DF 5/5, PF 5/5                    RIGHT LE - HF 5/5, KE 5/5, DF 5/5, PF 5/5        Sensory - Intact to LT     Reflexes - DTR Intact, No primitive reflexive     Coordination - FTN intact     OculoVestibular - No saccades, No nystagmus, VOR         Balance - WNL Static  Psychiatric - Mood stable, Affect WNL  ----------------------------------------------------------------------------------------   I came to see the patient. She has     89yF was admitted on     Patient is a 89y old  Female who presents with a chief complaint of CVA (26 Dec 2023 10:25)    HPI:  Pt seen and examined  HD # 1  89 yo female with hx of HTN on HCTZ per son; patient went to sleep last night at 10:30PM; patient was seen multiple times throughout the night by son- 4:30AM; 5:30AM; last seen at 7AM this morning- at baseline; found at 8:30AM; sitting on edge of bathtub; not speaking; not at baseline; required my immediate response.    NIHSS > 20 in ER, given tenectaplase    : Remains aphasic with Lt sided gaze preference. DBP > 110, drip ordered.     (24 Dec 2023 11:36)      Imaging performed:  Head CT 2023  Small focus of territory at risk for infarction of 5 mL of the left frontal cortex. No large vessel occlusion using CTA of the head and neck.    MRI Brain pending    REVIEW OF SYSTEMS  Constitutional - No fever, No weight loss, No fatigue  HEENT - No eye pain, No visual disturbances, No difficulty hearing, No tinnitus, No vertigo, No neck pain  Respiratory - No cough, No wheezing, No shortness of breath  Cardiovascular - No chest pain, No palpitations  Gastrointestinal - No abdominal pain, No nausea, No vomiting, No diarrhea, No constipation  Genitourinary - No dysuria, No frequency, No hematuria, No incontinence  Neurological - No headaches, No memory loss, No loss of strength, No numbness, No tremors  Skin - No itching, No rashes, No lesions   Endocrine - No temperature intolerance  Musculoskeletal - No joint pain, No joint swelling, No muscle pain  Psychiatric - No depression, No anxiety    VITALS  T(C): 35.4 (23 @ 08:00), Max: 38.8 (23 @ 18:00)  HR: 63 (23 @ 10:00) (63 - 113)  BP: 85/46 (23 @ 10:00) (85/46 - 137/72)  RR: 26 (23 @ 10:00) (20 - 32)  SpO2: 89% (23 @ 10:00) (87% - 94%)  Wt(kg): --    PAST MEDICAL & SURGICAL HISTORY  HTN (hypertension)        SOCIAL HISTORY - as per documentation/history  Smoking - None  EtOH - None  Drugs - None    FUNCTIONAL HISTORY  Lives   Independent    CURRENT FUNCTIONAL STATUS      FAMILY HISTORY       RECENT LABS - Reviewed  CBC Full  -  ( 26 Dec 2023 05:26 )  WBC Count : 15.02 K/uL  RBC Count : 4.11 M/uL  Hemoglobin : 12.4 g/dL  Hematocrit : 39.2 %  Platelet Count - Automated : 181 K/uL  Mean Cell Volume : 95.4 fl  Mean Cell Hemoglobin : 30.2 pg  Mean Cell Hemoglobin Concentration : 31.6 gm/dL  Auto Neutrophil # : x  Auto Lymphocyte # : x  Auto Monocyte # : x  Auto Eosinophil # : x  Auto Basophil # : x  Auto Neutrophil % : x  Auto Lymphocyte % : x  Auto Monocyte % : x  Auto Eosinophil % : x  Auto Basophil % : x        139  |  109<H>  |  37<H>  ----------------------------<  132<H>  4.0   |  21<L>  |  1.55<H>    Ca    8.4<L>      26 Dec 2023 05:26  Phos  5.8       Mg     1.9           Urinalysis Basic - ( 26 Dec 2023 05:26 )    Color: x / Appearance: x / SG: x / pH: x  Gluc: 132 mg/dL / Ketone: x  / Bili: x / Urobili: x   Blood: x / Protein: x / Nitrite: x   Leuk Esterase: x / RBC: x / WBC x   Sq Epi: x / Non Sq Epi: x / Bacteria: x        ALLERGIES  No Known Allergies      MEDICATIONS   acetaminophen  Suppository .. 650 milliGRAM(s) Rectal every 6 hours PRN  aspirin Suppository 300 milliGRAM(s) Rectal daily  atorvastatin 80 milliGRAM(s) Oral at bedtime  diltiazem Infusion 5 mG/Hr IV Continuous <Continuous>  glycopyrrolate Injectable 0.2 milliGRAM(s) IV Push every 6 hours PRN  heparin   Injectable 5000 Unit(s) SubCutaneous every 12 hours  morphine  - Injectable 2 milliGRAM(s) IV Push every 4 hours PRN  sodium chloride 0.9%. 1000 milliLiter(s) IV Continuous <Continuous>      ----------------------------------------------------------------------------------------  PHYSICAL EXAM  Constitutional - NAD, Comfortable  HEENT - NCAT, EOMI  Neck - Supple, No limited ROM  Chest - Breathing comfortably, No wheezing  Cardiovascular - S1S2   Abdomen - Soft   Extremities - No C/C/E, No calf tenderness   Neurologic Exam -                    Cognitive - AAO to self, place, date, year, situation     Communication - Fluent, No dysarthria     Cranial Nerves - CN 2-12 intact     Motor - No focal deficits                    LEFT    UE - ShAB 5/5, EF 5/5, EE 5/5, WE 5/5,  5/5                    RIGHT UE - ShAB 5/5, EF 5/5, EE 5/5, WE 5/5,  5/5                    LEFT    LE - HF 5/5, KE 5/5, DF 5/5, PF 5/5                    RIGHT LE - HF 5/5, KE 5/5, DF 5/5, PF 5/5        Sensory - Intact to LT     Reflexes - DTR Intact, No primitive reflexive     Coordination - FTN intact     OculoVestibular - No saccades, No nystagmus, VOR         Balance - WNL Static  Psychiatric - Mood stable, Affect WNL  ----------------------------------------------------------------------------------------   I came to see the patient. She has     89yF was admitted on     Patient is a 89y old  Female who presents with a chief complaint of CVA (26 Dec 2023 10:25)    HPI:  Pt seen and examined  HD # 1  88 yo female with hx of HTN on HCTZ per son; patient went to sleep last night at 10:30PM; patient was seen multiple times throughout the night by son- 4:30AM; 5:30AM; last seen at 7AM this morning- at baseline; found at 8:30AM; sitting on edge of bathtub; not speaking; not at baseline; required my immediate response.    NIHSS > 20 in ER, given tenectaplase    : Remains aphasic with Lt sided gaze preference. DBP > 110, drip ordered.     (24 Dec 2023 11:36)      Imaging performed:  Head CT 2023  Small focus of territory at risk for infarction of 5 mL of the left frontal cortex. No large vessel occlusion using CTA of the head and neck.    MRI Brain pending    REVIEW OF SYSTEMS  Constitutional - No fever, No weight loss, No fatigue  HEENT - No eye pain, No visual disturbances, No difficulty hearing, No tinnitus, No vertigo, No neck pain  Respiratory - No cough, No wheezing, No shortness of breath  Cardiovascular - No chest pain, No palpitations  Gastrointestinal - No abdominal pain, No nausea, No vomiting, No diarrhea, No constipation  Genitourinary - No dysuria, No frequency, No hematuria, No incontinence  Neurological - No headaches, No memory loss, No loss of strength, No numbness, No tremors  Skin - No itching, No rashes, No lesions   Endocrine - No temperature intolerance  Musculoskeletal - No joint pain, No joint swelling, No muscle pain  Psychiatric - No depression, No anxiety    VITALS  T(C): 35.4 (23 @ 08:00), Max: 38.8 (23 @ 18:00)  HR: 63 (23 @ 10:00) (63 - 113)  BP: 85/46 (23 @ 10:00) (85/46 - 137/72)  RR: 26 (23 @ 10:00) (20 - 32)  SpO2: 89% (23 @ 10:00) (87% - 94%)  Wt(kg): --    PAST MEDICAL & SURGICAL HISTORY  HTN (hypertension)        SOCIAL HISTORY - as per documentation/history  Smoking - None  EtOH - None  Drugs - None    FUNCTIONAL HISTORY  Lives   Independent    CURRENT FUNCTIONAL STATUS      FAMILY HISTORY       RECENT LABS - Reviewed  CBC Full  -  ( 26 Dec 2023 05:26 )  WBC Count : 15.02 K/uL  RBC Count : 4.11 M/uL  Hemoglobin : 12.4 g/dL  Hematocrit : 39.2 %  Platelet Count - Automated : 181 K/uL  Mean Cell Volume : 95.4 fl  Mean Cell Hemoglobin : 30.2 pg  Mean Cell Hemoglobin Concentration : 31.6 gm/dL  Auto Neutrophil # : x  Auto Lymphocyte # : x  Auto Monocyte # : x  Auto Eosinophil # : x  Auto Basophil # : x  Auto Neutrophil % : x  Auto Lymphocyte % : x  Auto Monocyte % : x  Auto Eosinophil % : x  Auto Basophil % : x        139  |  109<H>  |  37<H>  ----------------------------<  132<H>  4.0   |  21<L>  |  1.55<H>    Ca    8.4<L>      26 Dec 2023 05:26  Phos  5.8       Mg     1.9           Urinalysis Basic - ( 26 Dec 2023 05:26 )    Color: x / Appearance: x / SG: x / pH: x  Gluc: 132 mg/dL / Ketone: x  / Bili: x / Urobili: x   Blood: x / Protein: x / Nitrite: x   Leuk Esterase: x / RBC: x / WBC x   Sq Epi: x / Non Sq Epi: x / Bacteria: x        ALLERGIES  No Known Allergies      MEDICATIONS   acetaminophen  Suppository .. 650 milliGRAM(s) Rectal every 6 hours PRN  aspirin Suppository 300 milliGRAM(s) Rectal daily  atorvastatin 80 milliGRAM(s) Oral at bedtime  diltiazem Infusion 5 mG/Hr IV Continuous <Continuous>  glycopyrrolate Injectable 0.2 milliGRAM(s) IV Push every 6 hours PRN  heparin   Injectable 5000 Unit(s) SubCutaneous every 12 hours  morphine  - Injectable 2 milliGRAM(s) IV Push every 4 hours PRN  sodium chloride 0.9%. 1000 milliLiter(s) IV Continuous <Continuous>      ----------------------------------------------------------------------------------------  PHYSICAL EXAM  Constitutional - NAD, Comfortable  HEENT - NCAT, EOMI  Neck - Supple, No limited ROM  Chest - Breathing comfortably, No wheezing  Cardiovascular - S1S2   Abdomen - Soft   Extremities - No C/C/E, No calf tenderness   Neurologic Exam -                    Cognitive - AAO to self, place, date, year, situation     Communication - Fluent, No dysarthria     Cranial Nerves - CN 2-12 intact     Motor - No focal deficits                    LEFT    UE - ShAB 5/5, EF 5/5, EE 5/5, WE 5/5,  5/5                    RIGHT UE - ShAB 5/5, EF 5/5, EE 5/5, WE 5/5,  5/5                    LEFT    LE - HF 5/5, KE 5/5, DF 5/5, PF 5/5                    RIGHT LE - HF 5/5, KE 5/5, DF 5/5, PF 5/5        Sensory - Intact to LT     Reflexes - DTR Intact, No primitive reflexive     Coordination - FTN intact     OculoVestibular - No saccades, No nystagmus, VOR         Balance - WNL Static  Psychiatric - Mood stable, Affect WNL  ----------------------------------------------------------------------------------------

## 2023-12-26 NOTE — PROGRESS NOTE ADULT - SUBJECTIVE AND OBJECTIVE BOX
HPI:  89 yo woman with hx of HTN on HCTZ ADM from home, with sudden onset aphasia, right weakness, NIHSS > 20 in ER, given tenecteplase. Admitted to ICU. Unresponsive.       MEDICATIONS  (STANDING):  aspirin Suppository 300 milliGRAM(s) Rectal daily  atorvastatin 80 milliGRAM(s) Oral at bedtime  diltiazem Infusion 5 mG/Hr (5 mL/Hr) IV Continuous <Continuous>  heparin   Injectable 5000 Unit(s) SubCutaneous every 12 hours  sodium chloride 0.9%. 1000 milliLiter(s) (50 mL/Hr) IV Continuous <Continuous>    MEDICATIONS  (PRN):  acetaminophen  Suppository .. 650 milliGRAM(s) Rectal every 6 hours PRN Temp greater or equal to 38C (100.4F)  glycopyrrolate Injectable 0.2 milliGRAM(s) IV Push every 6 hours PRN Secretions  morphine  - Injectable 2 milliGRAM(s) IV Push every 4 hours PRN Respiratory distress      ICU Vital Signs Last 24 Hrs  T(C): 35.4 (26 Dec 2023 08:00), Max: 38.8 (25 Dec 2023 18:00)  T(F): 95.8 (26 Dec 2023 08:00), Max: 101.8 (25 Dec 2023 18:00)  HR: 75 (26 Dec 2023 08:00) (72 - 113)  BP: 106/53 (26 Dec 2023 08:00) (93/39 - 137/72)  BP(mean): 70 (26 Dec 2023 08:00) (55 - 94)  RR: 25 (26 Dec 2023 08:00) (20 - 32)  SpO2: 91% (26 Dec 2023 08:00) (87% - 94%)    O2 Parameters below as of 26 Dec 2023 08:00  Patient On (Oxygen Delivery Method): mask, nonrebreather    O2 Concentration (%): 100    Neurological Exam:  NIHSS: 26  HF: Patient is unresponsive  CN: Pupils are equal and reactive.  Dolls eye, no facial asymmetry. Tongue is midline.   Motor: no spont movements.   Sensory: no response to painful stim.  DTR: 0/4 all 4 extremities. Babinski are mute bilateral.  Co-ord:  Cannot test     WBC Count: 15.02: Test Repeated Specimen Integrity Verified. K/uL  RBC Count: 4.11 M/uL  Hemoglobin: 12.4 g/dL  Hematocrit: 39.2 %  Mean Cell Volume: 95.4 fl  Mean Cell Hemoglobin: 30.2 pg  Mean Cell Hemoglobin Conc: 31.6 gm/dL  Red Cell Distrib Width: 15.1 %  Platelet Count - Automated: 181 K/uL    Basic Metabolic Panel in AM (12.26.23 @ 05:26)   Sodium: 139 mmol/L  Potassium: 4.0 mmol/L  Chloride: 109 mmol/L  Carbon Dioxide: 21 mmol/L  Anion Gap: 9 mmol/L  Blood Urea Nitrogen: 37 mg/dL  Creatinine: 1.55 mg/dL  Glucose: 132 mg/dL  Calcium: 8.4 mg/dL  eGFR: 32:    < from: CT Head No Cont (12.25.23 @ 09:34) >  FINDINGS:   CT dated 12/24/2023 available for review.    The brain demonstrates moderate periventricular and deep white matter   ischemia. Small acute cortical infarction is seen in the LEFT lateral   frontal lobe at the convexity with loss of gray-white differentiation and   gyral edema. No intracranial hemorrhage is found.  No mass effect is   found in the brain.    The ventricles, sulci and basal cisterns appear unremarkable.    The orbits are unremarkable.  The paranasal sinuses are clear.  The nasal   cavity appears intact.  The nasopharynx is symmetric.  The central skull   base, petrous temporal bones and calvarium remain intact.      IMPRESSION:   Moderate periventricular and deep white matter ischemia.   Small acute cortical infarction is seen in the LEFT lateral frontal lobe   at the convexity with loss of gray-white differentiation and gyral edema.   No intracranial hemorrhage is found.    < end of copied text >      < from: CT Angio Neck Stroke Protocol w/ IV Cont (12.24.23 @ 10:07) >  CTA head and neck:    After the intravenous power injection of 80 cc of Omnipaque 350 using a   bolus obinna timing run serial thin sections were obtained through the   neck from the thoracic inlet through the intracranial circulation   centered at the [id number] on a multislice CT scanner reformatted   with coronal and sagittal 2 D-MIP projections, including 3 D   reconstructions using a separate 3D [employer] software workstation.A total   of  120 cc of Omnipaque were intravenously injected.  80 cc were discarded    The origins of the carotid and vertebral arteries are normal. There is an   extremely short right common carotid artery with a low bifurcation at the   level of T7. The carotid bifurcations demonstrate no stenosis. The right   vertebral artery is dominant.    The distal vertebral arteries are well identified as are the   posterior-inferior cerebellar arteries bilaterally. The region of the   vertebral basilar junction is normal. The basilar artery is normal. The   posterior cerebral and superior cerebellar arteries are normal.    Evaluation of the carotid arteries demonstratenormal appearance to the   distal cervical, petrous, cavernous and supraclinoid internal carotid   arteries. The anterior cerebral arteries anterior communicating artery   and middle cerebral arteries are normal, the left A1 segment is dominant.    There are no large vessel occlusions.    The normal intracranial venous circulation is identified. The left   transverse sinus is dominant. The superior sagittal sinus, internal   cerebral veins, vein of Kev, straight sinus, transverse sinuses,   sigmoid sinuses and internal jugular veins are normal. Cortical veins are   normal.    There is a right pleural effusion. Mild degenerative changes of the   cervical spine are noted.    IMPRESSION: Small focus of territory at risk for infarction of 5 mL of   the left frontal cortex. No large vessel occlusion using CTA of the head   and neck.    < end of copied text >   HPI:  88 yo woman with hx of HTN on HCTZ ADM from home, with sudden onset aphasia, right weakness, NIHSS > 20 in ER, given tenecteplase. Admitted to ICU. Unresponsive.       MEDICATIONS  (STANDING):  aspirin Suppository 300 milliGRAM(s) Rectal daily  atorvastatin 80 milliGRAM(s) Oral at bedtime  diltiazem Infusion 5 mG/Hr (5 mL/Hr) IV Continuous <Continuous>  heparin   Injectable 5000 Unit(s) SubCutaneous every 12 hours  sodium chloride 0.9%. 1000 milliLiter(s) (50 mL/Hr) IV Continuous <Continuous>    MEDICATIONS  (PRN):  acetaminophen  Suppository .. 650 milliGRAM(s) Rectal every 6 hours PRN Temp greater or equal to 38C (100.4F)  glycopyrrolate Injectable 0.2 milliGRAM(s) IV Push every 6 hours PRN Secretions  morphine  - Injectable 2 milliGRAM(s) IV Push every 4 hours PRN Respiratory distress      ICU Vital Signs Last 24 Hrs  T(C): 35.4 (26 Dec 2023 08:00), Max: 38.8 (25 Dec 2023 18:00)  T(F): 95.8 (26 Dec 2023 08:00), Max: 101.8 (25 Dec 2023 18:00)  HR: 75 (26 Dec 2023 08:00) (72 - 113)  BP: 106/53 (26 Dec 2023 08:00) (93/39 - 137/72)  BP(mean): 70 (26 Dec 2023 08:00) (55 - 94)  RR: 25 (26 Dec 2023 08:00) (20 - 32)  SpO2: 91% (26 Dec 2023 08:00) (87% - 94%)    O2 Parameters below as of 26 Dec 2023 08:00  Patient On (Oxygen Delivery Method): mask, nonrebreather    O2 Concentration (%): 100    Neurological Exam:  NIHSS: 26  HF: Patient is unresponsive  CN: Pupils are equal and reactive.  Dolls eye, no facial asymmetry. Tongue is midline.   Motor: no spont movements.   Sensory: no response to painful stim.  DTR: 0/4 all 4 extremities. Babinski are mute bilateral.  Co-ord:  Cannot test     WBC Count: 15.02: Test Repeated Specimen Integrity Verified. K/uL  RBC Count: 4.11 M/uL  Hemoglobin: 12.4 g/dL  Hematocrit: 39.2 %  Mean Cell Volume: 95.4 fl  Mean Cell Hemoglobin: 30.2 pg  Mean Cell Hemoglobin Conc: 31.6 gm/dL  Red Cell Distrib Width: 15.1 %  Platelet Count - Automated: 181 K/uL    Basic Metabolic Panel in AM (12.26.23 @ 05:26)   Sodium: 139 mmol/L  Potassium: 4.0 mmol/L  Chloride: 109 mmol/L  Carbon Dioxide: 21 mmol/L  Anion Gap: 9 mmol/L  Blood Urea Nitrogen: 37 mg/dL  Creatinine: 1.55 mg/dL  Glucose: 132 mg/dL  Calcium: 8.4 mg/dL  eGFR: 32:    < from: CT Head No Cont (12.25.23 @ 09:34) >  FINDINGS:   CT dated 12/24/2023 available for review.    The brain demonstrates moderate periventricular and deep white matter   ischemia. Small acute cortical infarction is seen in the LEFT lateral   frontal lobe at the convexity with loss of gray-white differentiation and   gyral edema. No intracranial hemorrhage is found.  No mass effect is   found in the brain.    The ventricles, sulci and basal cisterns appear unremarkable.    The orbits are unremarkable.  The paranasal sinuses are clear.  The nasal   cavity appears intact.  The nasopharynx is symmetric.  The central skull   base, petrous temporal bones and calvarium remain intact.      IMPRESSION:   Moderate periventricular and deep white matter ischemia.   Small acute cortical infarction is seen in the LEFT lateral frontal lobe   at the convexity with loss of gray-white differentiation and gyral edema.   No intracranial hemorrhage is found.    < end of copied text >      < from: CT Angio Neck Stroke Protocol w/ IV Cont (12.24.23 @ 10:07) >  CTA head and neck:    After the intravenous power injection of 80 cc of Omnipaque 350 using a   bolus obinna timing run serial thin sections were obtained through the   neck from the thoracic inlet through the intracranial circulation   centered at the [id number] on a multislice CT scanner reformatted   with coronal and sagittal 2 D-MIP projections, including 3 D   reconstructions using a separate [employer] software workstation.A total   of  120 cc of Omnipaque were intravenously injected.  80 cc were discarded    The origins of the carotid and vertebral arteries are normal. There is an   extremely short right common carotid artery with a low bifurcation at the   level of T7. The carotid bifurcations demonstrate no stenosis. The right   vertebral artery is dominant.    The distal vertebral arteries are well identified as are the   posterior-inferior cerebellar arteries bilaterally. The region of the   vertebral basilar junction is normal. The basilar artery is normal. The   posterior cerebral and superior cerebellar arteries are normal.    Evaluation of the carotid arteries demonstratenormal appearance to the   distal cervical, petrous, cavernous and supraclinoid internal carotid   arteries. The anterior cerebral arteries anterior communicating artery   and middle cerebral arteries are normal, the left A1 segment is dominant.    There are no large vessel occlusions.    The normal intracranial venous circulation is identified. The left   transverse sinus is dominant. The superior sagittal sinus, internal   cerebral veins, vein of Kev, straight sinus, transverse sinuses,   sigmoid sinuses and internal jugular veins are normal. Cortical veins are   normal.    There is a right pleural effusion. Mild degenerative changes of the   cervical spine are noted.    IMPRESSION: Small focus of territory at risk for infarction of 5 mL of   the left frontal cortex. No large vessel occlusion using CTA of the head   and neck.    < end of copied text >

## 2023-12-26 NOTE — PROGRESS NOTE ADULT - SUBJECTIVE AND OBJECTIVE BOX
HPI:  88 yo female with hx of HTN on HCTZ per son; patient went to sleep last night at 10:30PM; patient was seen multiple times throughout the night by son- 4:30AM; 5:30AM; last seen at 7AM this morning- at baseline; found at 8:30AM; sitting on edge of bathtub; not speaking; not at baseline;   NIHSS > 20 in ER, given tenectaplase    12/24: Remains aphasic with Lt sided gaze preference. DBP > 110, drip ordered.    12/25: Patient having difficulty controlling her secretions, Neglecting the Right  12/16: PAtient unresponsive, desaturating, family called in to see the patient       PAST MEDICAL & SURGICAL HISTORY:  HTN (hypertension)          FAMILY HISTORY:      Social Hx:    Allergies    No Known Allergies    Intolerances            ICU Vital Signs Last 24 Hrs  T(C): 35.4 (26 Dec 2023 08:00), Max: 38.8 (25 Dec 2023 18:00)  T(F): 95.8 (26 Dec 2023 08:00), Max: 101.8 (25 Dec 2023 18:00)  HR: 63 (26 Dec 2023 10:00) (63 - 113)  BP: 85/46 (26 Dec 2023 10:00) (85/46 - 137/72)  BP(mean): 59 (26 Dec 2023 10:00) (55 - 94)  ABP: --  ABP(mean): --  RR: 26 (26 Dec 2023 10:00) (20 - 32)  SpO2: 89% (26 Dec 2023 10:00) (87% - 94%)    O2 Parameters below as of 26 Dec 2023 10:00  Patient On (Oxygen Delivery Method): mask, nonrebreather    O2 Concentration (%): 100            I&O's Summary    25 Dec 2023 07:01  -  26 Dec 2023 07:00  --------------------------------------------------------  IN: 1807 mL / OUT: 99 mL / NET: 1708 mL                              12.4   15.02 )-----------( 181      ( 26 Dec 2023 05:26 )             39.2       12-26    139  |  109<H>  |  37<H>  ----------------------------<  132<H>  4.0   |  21<L>  |  1.55<H>    Ca    8.4<L>      26 Dec 2023 05:26  Phos  5.8     12-26  Mg     1.9     12-26                  Urinalysis Basic - ( 26 Dec 2023 05:26 )    Color: x / Appearance: x / SG: x / pH: x  Gluc: 132 mg/dL / Ketone: x  / Bili: x / Urobili: x   Blood: x / Protein: x / Nitrite: x   Leuk Esterase: x / RBC: x / WBC x   Sq Epi: x / Non Sq Epi: x / Bacteria: x        MEDICATIONS  (STANDING):  aspirin Suppository 300 milliGRAM(s) Rectal daily  atorvastatin 80 milliGRAM(s) Oral at bedtime  diltiazem Infusion 5 mG/Hr (5 mL/Hr) IV Continuous <Continuous>  heparin   Injectable 5000 Unit(s) SubCutaneous every 12 hours  sodium chloride 0.9%. 1000 milliLiter(s) (50 mL/Hr) IV Continuous <Continuous>    MEDICATIONS  (PRN):  acetaminophen  Suppository .. 650 milliGRAM(s) Rectal every 6 hours PRN Temp greater or equal to 38C (100.4F)  glycopyrrolate Injectable 0.2 milliGRAM(s) IV Push every 6 hours PRN Secretions  morphine  - Injectable 2 milliGRAM(s) IV Push every 4 hours PRN Respiratory distress      DVT Prophylaxis:    Advanced Directives:  Discussed with:    Visit Information:    ** Time is exclusive of billed procedures and/or teaching and/or routine family updates.

## 2023-12-26 NOTE — PROGRESS NOTE ADULT - NUTRITIONAL ASSESSMENT
This patient has been assessed with a concern for Malnutrition and has been determined to have a diagnosis/diagnoses of Severe protein-calorie malnutrition.    This patient is being managed with:   Diet NPO-  Entered: Dec 24 2023 11:53AM  
This patient has been assessed with a concern for Malnutrition and has been determined to have a diagnosis/diagnoses of Severe protein-calorie malnutrition.    This patient is being managed with:   Diet NPO-  Entered: Dec 24 2023 11:53AM

## 2023-12-26 NOTE — CONSULT NOTE ADULT - ASSESSMENT
ASSESSMENT/PLAN  89yFemale with functional deficits after  Pain - Tylenol  DVT PPX - SCDs  Rehab -    Recommend ACUTE inpatient rehabilitation for the functional deficits consisting of 3 hours of therapy/day & 24 hour RN/daily PMR physician for comorbid medical management. Patient will be able to tolerate 3 hours a day.   Recommend [facility], patient DOES NOT meet acute inpatient rehabilitation criteria. Patient needs a more prolonged stay to achieve transition to community.    Expect patient to achieve functional goals for DC HOME with OUTPATIENT   Expect patient to achieve functional goals for DC HOME with HOME CARE   Follow up with CONCUSSION PROGRAM - Call [phone] for an appointment    Will continue to follow. Functional progress will determine ongoing rehab dispo recommendations, which may change.    Continue bedside therapy as well as OOB throughout the day with mobilization by staff to maintain cardiopulmonary function and prevention of secondary complications related to debility.      ASSESSMENT/PLAN  89yFemale with functional deficits after  Pain - Tylenol  DVT PPX - SCDs  Rehab -    Recommend ACUTE inpatient rehabilitation for the functional deficits consisting of 3 hours of therapy/day & 24 hour RN/daily PMR physician for comorbid medical management. Patient will be able to tolerate 3 hours a day.   Recommend [facility]I, patient DOES NOT meet acute inpatient rehabilitation criteria. Patient needs a more prolonged stay to achieve transition to community.    Expect patient to achieve functional goals for DC HOME with OUTPATIENT   Expect patient to achieve functional goals for DC HOME with HOME CARE   Follow up with CONCUSSION PROGRAM - Call [phone] for an appointment    Will continue to follow. Functional progress will determine ongoing rehab dispo recommendations, which may change.    Continue bedside therapy as well as OOB throughout the day with mobilization by staff to maintain cardiopulmonary function and prevention of secondary complications related to debility.      Consult was cancelled. Patient

## 2023-12-26 NOTE — PROGRESS NOTE ADULT - ASSESSMENT
A/P: 89 female presenting with a CVA and received TNK    Plan:  ICU  NPOl  PAtient essentiaally on Comfort care  Family on their way in  She will be a DNR/DNI.    SQH DVT Prophylaxis    D/W the patients Son

## 2023-12-26 NOTE — PROGRESS NOTE ADULT - ASSESSMENT
89 year old woman hx of HTN, presents with new L CVA, NIHSS 26. CT head + left frontal cortical CVA, ? embolic.  CT angios no LVO. s/p TNK. Adm to ICU. worsening MS. family thinking of comfort care.  Suggest:  neuro checks q 4  NPO till mental status improves  keep head elevated  rectal asa
